# Patient Record
Sex: FEMALE | Race: WHITE | Employment: UNEMPLOYED | URBAN - METROPOLITAN AREA
[De-identification: names, ages, dates, MRNs, and addresses within clinical notes are randomized per-mention and may not be internally consistent; named-entity substitution may affect disease eponyms.]

---

## 2017-01-19 ENCOUNTER — TELEPHONE (OUTPATIENT)
Dept: FAMILY MEDICINE CLINIC | Age: 27
End: 2017-01-19

## 2017-02-24 DIAGNOSIS — F41.9 ANXIETY: ICD-10-CM

## 2017-04-18 ENCOUNTER — OFFICE VISIT (OUTPATIENT)
Dept: FAMILY MEDICINE CLINIC | Age: 27
End: 2017-04-18

## 2017-04-18 VITALS
SYSTOLIC BLOOD PRESSURE: 116 MMHG | RESPIRATION RATE: 18 BRPM | BODY MASS INDEX: 21.52 KG/M2 | TEMPERATURE: 97.9 F | HEART RATE: 98 BPM | WEIGHT: 142 LBS | DIASTOLIC BLOOD PRESSURE: 70 MMHG | HEIGHT: 68 IN

## 2017-04-18 DIAGNOSIS — F32.A DEPRESSION, UNSPECIFIED DEPRESSION TYPE: Primary | ICD-10-CM

## 2017-04-18 DIAGNOSIS — F41.9 ANXIETY: ICD-10-CM

## 2017-04-18 PROCEDURE — 99212 OFFICE O/P EST SF 10 MIN: CPT | Performed by: NURSE PRACTITIONER

## 2017-04-18 RX ORDER — ALPRAZOLAM 0.25 MG/1
0.25 TABLET ORAL NIGHTLY PRN
Qty: 10 TABLET | Refills: 0 | Status: SHIPPED | OUTPATIENT
Start: 2017-04-18 | End: 2018-04-05

## 2018-04-05 PROBLEM — I51.9: Status: ACTIVE | Noted: 2018-04-05

## 2018-04-05 PROBLEM — O99.419: Status: ACTIVE | Noted: 2018-04-05

## 2018-04-20 PROBLEM — Z13.79 GENETIC SCREENING: Status: ACTIVE | Noted: 2018-04-20

## 2018-04-20 PROBLEM — I51.9 HEART DISEASE IN MOTHER AFFECTING PREGNANCY IN FIRST TRIMESTER: Status: ACTIVE | Noted: 2018-04-20

## 2018-04-20 PROBLEM — O99.411 HEART DISEASE IN MOTHER AFFECTING PREGNANCY IN FIRST TRIMESTER: Status: ACTIVE | Noted: 2018-04-20

## 2018-04-20 PROBLEM — O99.419 HEART DISEASE IN MOTHER COMPLICATING PREGNANCY: Status: ACTIVE | Noted: 2018-04-20

## 2018-05-20 PROBLEM — Z13.79 GENETIC SCREENING: Status: RESOLVED | Noted: 2018-04-20 | Resolved: 2018-05-20

## 2018-06-28 PROBLEM — O44.40 LOW-LYING PLACENTA: Status: ACTIVE | Noted: 2018-06-28

## 2018-09-04 PROBLEM — Z34.90 PRENATAL CARE, ANTEPARTUM: Status: ACTIVE | Noted: 2018-09-04

## 2018-10-09 PROBLEM — O26.613 CHOLESTASIS OF PREGNANCY IN THIRD TRIMESTER: Status: ACTIVE | Noted: 2018-10-09

## 2018-10-09 PROBLEM — K83.1 CHOLESTASIS OF PREGNANCY IN THIRD TRIMESTER: Status: ACTIVE | Noted: 2018-10-09

## 2018-11-30 PROBLEM — Z34.90 PRENATAL CARE, ANTEPARTUM: Status: RESOLVED | Noted: 2018-09-04 | Resolved: 2018-11-30

## 2018-11-30 PROBLEM — O99.419: Status: RESOLVED | Noted: 2018-04-05 | Resolved: 2018-11-30

## 2018-11-30 PROBLEM — K83.1 CHOLESTASIS OF PREGNANCY IN THIRD TRIMESTER: Status: RESOLVED | Noted: 2018-10-09 | Resolved: 2018-11-30

## 2018-11-30 PROBLEM — O44.40 LOW-LYING PLACENTA: Status: RESOLVED | Noted: 2018-06-28 | Resolved: 2018-11-30

## 2018-11-30 PROBLEM — O26.613 CHOLESTASIS OF PREGNANCY IN THIRD TRIMESTER: Status: RESOLVED | Noted: 2018-10-09 | Resolved: 2018-11-30

## 2018-11-30 PROBLEM — I51.9: Status: RESOLVED | Noted: 2018-04-05 | Resolved: 2018-11-30

## 2020-06-01 PROBLEM — O99.419 CARDIAC CONDITION AFFECTING PREGNANCY, ANTEPARTUM: Status: ACTIVE | Noted: 2020-06-01

## 2020-06-01 PROBLEM — I51.9 CARDIAC CONDITION AFFECTING PREGNANCY, ANTEPARTUM: Status: ACTIVE | Noted: 2020-06-01

## 2020-06-01 PROBLEM — Z34.90 PRENATAL CARE: Status: ACTIVE | Noted: 2020-06-01

## 2020-11-01 PROBLEM — O09.891 SUPERVISION OF OTHER HIGH RISK PREGNANCIES, FIRST TRIMESTER: Status: ACTIVE | Noted: 2020-06-12

## 2020-12-12 PROBLEM — O60.00 PREMATURE LABOR: Status: ACTIVE | Noted: 2020-12-12

## 2020-12-13 PROBLEM — O09.891 SUPERVISION OF OTHER HIGH RISK PREGNANCIES, FIRST TRIMESTER: Status: RESOLVED | Noted: 2020-06-12 | Resolved: 2020-12-13

## 2020-12-13 PROBLEM — I51.9 CARDIAC CONDITION AFFECTING PREGNANCY, ANTEPARTUM: Status: RESOLVED | Noted: 2020-06-01 | Resolved: 2020-12-13

## 2020-12-13 PROBLEM — O99.419 CARDIAC CONDITION AFFECTING PREGNANCY, ANTEPARTUM: Status: RESOLVED | Noted: 2020-06-01 | Resolved: 2020-12-13

## 2020-12-13 PROBLEM — Z34.90 PRENATAL CARE: Status: RESOLVED | Noted: 2020-06-01 | Resolved: 2020-12-13

## 2023-02-01 PROBLEM — J01.90 ACUTE SINUSITIS: Status: ACTIVE | Noted: 2023-02-01

## 2023-02-01 PROBLEM — F41.1 GENERALIZED ANXIETY DISORDER: Status: ACTIVE | Noted: 2023-02-01

## 2023-02-01 PROBLEM — F32.A DEPRESSION: Status: ACTIVE | Noted: 2023-02-01

## 2023-02-01 PROBLEM — J30.9 ALLERGIC RHINITIS: Status: ACTIVE | Noted: 2023-02-01

## 2023-02-01 RX ORDER — HYDROXYZINE HYDROCHLORIDE 25 MG/1
25 TABLET, FILM COATED ORAL 3 TIMES DAILY PRN
COMMUNITY

## 2023-03-15 ENCOUNTER — APPOINTMENT (OUTPATIENT)
Dept: PRIMARY CARE | Facility: CLINIC | Age: 33
End: 2023-03-15
Payer: COMMERCIAL

## 2023-04-19 ENCOUNTER — OFFICE VISIT (OUTPATIENT)
Dept: PRIMARY CARE | Facility: CLINIC | Age: 33
End: 2023-04-19
Payer: MEDICAID

## 2023-04-19 VITALS
TEMPERATURE: 98.1 F | DIASTOLIC BLOOD PRESSURE: 83 MMHG | BODY MASS INDEX: 27.37 KG/M2 | HEART RATE: 80 BPM | HEIGHT: 69 IN | SYSTOLIC BLOOD PRESSURE: 122 MMHG | WEIGHT: 184.8 LBS | OXYGEN SATURATION: 97 %

## 2023-04-19 DIAGNOSIS — Z76.89 ENCOUNTER TO ESTABLISH CARE: Primary | ICD-10-CM

## 2023-04-19 DIAGNOSIS — Z95.810 CARDIAC DEFIBRILLATOR IN PLACE: ICD-10-CM

## 2023-04-19 DIAGNOSIS — F32.A DEPRESSION, UNSPECIFIED DEPRESSION TYPE: ICD-10-CM

## 2023-04-19 DIAGNOSIS — F41.1 GENERALIZED ANXIETY DISORDER: ICD-10-CM

## 2023-04-19 DIAGNOSIS — I47.29 OTHER VENTRICULAR TACHYCARDIA (MULTI): ICD-10-CM

## 2023-04-19 PROBLEM — J30.9 ALLERGIC RHINITIS: Status: RESOLVED | Noted: 2023-02-01 | Resolved: 2023-04-19

## 2023-04-19 PROBLEM — Z00.00 HEALTHCARE MAINTENANCE: Status: ACTIVE | Noted: 2023-04-19

## 2023-04-19 PROBLEM — J01.90 ACUTE SINUSITIS: Status: RESOLVED | Noted: 2023-02-01 | Resolved: 2023-04-19

## 2023-04-19 PROCEDURE — 99214 OFFICE O/P EST MOD 30 MIN: CPT | Performed by: NURSE PRACTITIONER

## 2023-04-19 PROCEDURE — 1036F TOBACCO NON-USER: CPT | Performed by: NURSE PRACTITIONER

## 2023-04-19 ASSESSMENT — PATIENT HEALTH QUESTIONNAIRE - PHQ9
8. MOVING OR SPEAKING SO SLOWLY THAT OTHER PEOPLE COULD HAVE NOTICED. OR THE OPPOSITE, BEING SO FIGETY OR RESTLESS THAT YOU HAVE BEEN MOVING AROUND A LOT MORE THAN USUAL: NOT AT ALL
SUM OF ALL RESPONSES TO PHQ QUESTIONS 1-9: 12
4. FEELING TIRED OR HAVING LITTLE ENERGY: MORE THAN HALF THE DAYS
10. IF YOU CHECKED OFF ANY PROBLEMS, HOW DIFFICULT HAVE THESE PROBLEMS MADE IT FOR YOU TO DO YOUR WORK, TAKE CARE OF THINGS AT HOME, OR GET ALONG WITH OTHER PEOPLE: SOMEWHAT DIFFICULT
1. LITTLE INTEREST OR PLEASURE IN DOING THINGS: NEARLY EVERY DAY
7. TROUBLE CONCENTRATING ON THINGS, SUCH AS READING THE NEWSPAPER OR WATCHING TELEVISION: NOT AT ALL
2. FEELING DOWN, DEPRESSED OR HOPELESS: NEARLY EVERY DAY
6. FEELING BAD ABOUT YOURSELF - OR THAT YOU ARE A FAILURE OR HAVE LET YOURSELF OR YOUR FAMILY DOWN: SEVERAL DAYS
3. TROUBLE FALLING OR STAYING ASLEEP OR SLEEPING TOO MUCH: NEARLY EVERY DAY
SUM OF ALL RESPONSES TO PHQ9 QUESTIONS 1 AND 2: 6
5. POOR APPETITE OR OVEREATING: NOT AT ALL
9. THOUGHTS THAT YOU WOULD BE BETTER OFF DEAD, OR OF HURTING YOURSELF: NOT AT ALL

## 2023-04-19 ASSESSMENT — ENCOUNTER SYMPTOMS
PALPITATIONS: 0
EYE ITCHING: 0
HEMATURIA: 0
NAUSEA: 0
CHILLS: 0
DYSURIA: 0
WOUND: 0
COUGH: 0
ARTHRALGIAS: 0
CONSTIPATION: 0
FEVER: 0
VOMITING: 0
FREQUENCY: 0
SHORTNESS OF BREATH: 0
EYE PAIN: 0
NERVOUS/ANXIOUS: 0
DIARRHEA: 0
ABDOMINAL DISTENTION: 0
NUMBNESS: 0
ACTIVITY CHANGE: 0
ABDOMINAL PAIN: 0
APPETITE CHANGE: 0
WHEEZING: 0

## 2023-04-19 NOTE — ASSESSMENT & PLAN NOTE
-chronic  -Denies suicidal ideation currently but does report history of this while on Celexa.   -Currently not taking any antidepressant and is interested in starting back on Trintellix  -She was taken off Trintellix due to wanting to become pregnant at that time.     Plan:  - trintellix ordered  - patient to follow up in 2 weeks

## 2023-04-19 NOTE — PATIENT INSTRUCTIONS
Obtain blood work as ordered  Start medication as prescribed   Call if you have any questions or concerns   Follow up in two weeks

## 2023-04-19 NOTE — ASSESSMENT & PLAN NOTE
-Dx in 2006 after syncopal episode running track   -Defibrillator placed in 2006 and replaced in 2016  -Follows with Cardiologist (Dr. Tera Valdez at [facility]): follows with them ever 1 year, defibrillator checks occur every 3 months.

## 2023-04-19 NOTE — PROGRESS NOTES
Subjective   Chief Complaint: New Patient Visit.    HPI   Ayesha Mata is a 32 y.o. female who presents for New Patient Visit.    Patient presents to [facility] care.  Patient reports that she recently moved and is looking for a new PCP.    She reports feeling overall well.  She does report increase in her anxiety and depression and is interested in restarting an antidepressant/anxiolytic.    Patient reports that she struggled with depression/anxiety for many years and has tried various SSRIs and SNRIs with unfavorable side effects.  While on Celexa she had thoughts of suicide and was switched to Trintellix.  Patient is interested in restarting the Trintellix.  She reports she was initially taken off of it because of her plans to become pregnant.  She now has a 4-year-old and a 2-year-old and does not have any plans to become pregnant.    Patient reports that her last menstrual period was 2 weeks ago.  She follows with [facility] in [facility] for pap and says she is not due for one until next year - patient will have these records sent to our office     She denies suicidal ideation.  She feels that her depression is mild but her anxiety is increased.    Patient follows with cardiologist Dr. Tera Valdez at [facility] for Catecholaminergic polymorphic ventricular tachycardia which was diagnosed in 2006 at which time she had a defibrillator placed.  This defibrillator was replaced in 2016.  Patient reports that she follows with Dr. Tera Valdez every year and has defibrillator readings done every 3 months.  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   HENT:  Negative for congestion.    Eyes:  Negative for pain and itching.   Respiratory:  Negative for cough, shortness of breath and wheezing.    Cardiovascular:  Negative for chest pain, palpitations and leg swelling.   Gastrointestinal:  Negative for abdominal distention, abdominal pain, constipation, diarrhea, nausea and vomiting.   Genitourinary:  Negative for dysuria, frequency, hematuria and urgency.   Musculoskeletal:  Negative for arthralgias and gait problem.   Skin:  Negative for rash and wound.   Neurological:  Negative for numbness.   Psychiatric/Behavioral:  The patient is not nervous/anxious.        Objective   /83   Pulse 80   Temp 36.7 °C (98.1 °F) (Temporal)   Ht 1.74 m (5' 8.5\")   Wt 83.8 kg (184 lb 12.8 oz)   LMP 04/06/2023   SpO2 97%   BMI 27.69 kg/m²   BSA Body surface area is 2.01 meters squared.      Physical Exam  Constitutional:       Appearance: Normal appearance.   HENT:      Head: Normocephalic.      Right Ear: Tympanic membrane and external ear normal.      Left Ear: Tympanic membrane and external ear normal.      Nose: Nose normal.      Mouth/Throat:      Mouth: Mucous membranes are moist.      Pharynx: Oropharynx is clear.   Eyes:      Extraocular Movements: Extraocular movements intact.      Conjunctiva/sclera: Conjunctivae normal.      Pupils: Pupils are equal, round, and reactive to light.   Cardiovascular:      Rate and Rhythm: Normal rate and regular rhythm.      Pulses: Normal pulses.      Heart sounds: Normal heart sounds.   Pulmonary:      Effort: Pulmonary effort is normal.      Breath sounds: Normal breath sounds.   Abdominal:      General: Abdomen is flat. Bowel sounds are normal.      Palpations: Abdomen is soft.   Musculoskeletal:         General: Normal range of " motion.      Cervical back: Normal range of motion.      Right lower leg: No edema.      Left lower leg: No edema.   Skin:     General: Skin is warm and dry.   Neurological:      General: No focal deficit present.      Mental Status: She is alert and oriented to person, place, and time.   Psychiatric:         Mood and Affect: Mood is not anxious or depressed.       Legacy Encounter on 09/28/2022   Component Date Value Ref Range Status    Tobacco Screen, Urine 09/28/2022 NEGATIVE   Final    Comment: Cotinine, a metabolite of nicotine, is measured to screen   for nicotine exposure. The cut-off is set at 300ng/mL to   detect active exposure (smoking).    This test was developed and its performance characteristics   were determined by the [facility].     Legacy Encounter on 07/21/2022   Component Date Value Ref Range Status    WBC 07/21/2022 7.8  4.4 - 11.3 x10E9/L Final    RBC 07/21/2022 4.92  4.00 - 5.20 x10E12/L Final    Hemoglobin 07/21/2022 13.5  12.0 - 16.0 g/dL Final    Hematocrit 07/21/2022 43.2  36.0 - 46.0 % Final    MCV 07/21/2022 88  80 - 100 fL Final    MCHC 07/21/2022 31.3 (L)  32.0 - 36.0 g/dL Final    Platelets 07/21/2022 287  150 - 450 x10E9/L Final    RDW 07/21/2022 13.2  11.5 - 14.5 % Final    Lead 07/21/2022 <0.5  0.0 - 4.9 ug/dL Final    Comment:  The performance characteristics of this test have    been determined by [facility].   This test has not been approved by the FDA; however, the FDA    has determined that such clearance is not necessary.   This test is used for clinical purposes and should not be    regarded as investigational or for research.   This laboratory is certified under CLIA to perform high   complexity clinical laboratory testing.  INTERPRETATION:  0-5 ug/dL     WITHIN NORMAL RANGE FOR AN ADULT.    6-9 ug/dL     ALTHOUGH THIS LEVEL IS SLIGHTLY ABOVE                 NORMAL, THERE ARE NO DEFINITE  HEALTH                  EFFECTS  OR SYMPTOMS AT THIS LEVEL.                 PERFORM FOLLOW-UP TESTING 2-3 MONTHS.    10-39 ug/dL   PERFORM FOLLOW-UP TESTING IN 1-3 MONTHS                 CONDUCT OCCUPATIONAL, ENVIRONMENTAL AND                 MEDICAL HISTORY.POSSIBLE HEALTH EFFECTS                AT THIS LEVEL INCLUDE ELEVATED BLOOD                 PRESSURE,EFFECTS ON KIDNEY AND BRAI                           N,                 SPONTANEOUS ABORTIONS AND OTHER                 REPRODUCTIVE EFFECTS. NO ACTION IS                 REQUIRED UNDER THE OSHA STANDARD FOR                THIS LEAD LEVEL.  HOWEVER, UNDER OSHA,                 A PHYSICIAN MAY RECOMMEND A MEDICAL                 EXAMINATIONOF A LEAD-EXPOSED WORKER                 TO ASSESS FOR LEAD EFFECTS.    40-59 ug/dL   CONDUCT HISTORY, PHYSICAL EXAM AND                 CLINICAL ASSESSMENT FOR LEAD POISONING.                  REMOVE FROM LEAD EXPOSURE (OSHA                  REQUIRES REMOVAL IF AVERAGE OF 3                  CONSECUTIVE BLOOD LEAD LEVELSARE                 50 ug/dL OR MORE).                  PERFORM FOLLOW-UPTESTING IN 2-4 WEEKS.    60-PLUS ug/dL IMMEDIATE REMOVAL FROM LEAD EXPOSURE.                 REFER TO OCCUPATIONAL MEDICINE                 SPECIALIST  FOR EVALUATION AND                   CONSIDERATION OFCHELATION TREATMENT.    REFERENCES:   1.OSHA LEAD STANDARD 1910.1025                2.ASSOCIATION OF OCCUPATIONA                           L AND                   ENVIRONMENTAL CLINICS, MEDICAL                   MANAGEMENT GUIDELINES FOR LEAD-                   EXPOSED ADULTS, ([email or web address] UNDER                   PRINCIPLES AND GUIDANCE.      Glucose 07/21/2022 84  74 - 99 mg/dL Final    Sodium 07/21/2022 139  136 - 145 mmol/L Final    Potassium 07/21/2022 3.8  3.5 - 5.3 mmol/L Final    Chloride 07/21/2022 104  98 - 107 mmol/L Final    Bicarbonate 07/21/2022 27  21 - 32 mmol/L Final    Anion Gap 07/21/2022 12  10 - 20 mmol/L Final    Urea  Nitrogen 07/21/2022 8  6 - 23 mg/dL Final    Creatinine 07/21/2022 0.72  0.50 - 1.05 mg/dL Final    GFR Female 07/21/2022 >90  >90 mL/min/1.73m2 Final    Comment:  CALCULATIONS OF ESTIMATED GFR ARE PERFORMED   USING THE 2021 CKD-EPI STUDY REFIT EQUATION   WITHOUT THE RACE VARIABLE FOR THE IDMS-TRACEABLE   CREATININE METHODS.    https://jasn.asnjournals.org/content/early/2021/09/22/ASN.6684146700      Calcium 07/21/2022 9.4  8.6 - 10.3 mg/dL Final    Albumin 07/21/2022 4.8  3.4 - 5.0 g/dL Final    Alkaline Phosphatase 07/21/2022 47  33 - 110 U/L Final    Total Protein 07/21/2022 7.4  6.4 - 8.2 g/dL Final    AST 07/21/2022 14  9 - 39 U/L Final    Total Bilirubin 07/21/2022 0.5  0.0 - 1.2 mg/dL Final    ALT (SGPT) 07/21/2022 10  7 - 45 U/L Final    Comment:  Patients treated with Sulfasalazine may generate    falsely decreased results for ALT.       Current Outpatient Medications on File Prior to Visit   Medication Sig Dispense Refill    hydrOXYzine HCL (Atarax) 25 mg tablet Take 1 tablet (25 mg) by mouth 3 times a day as needed.       No current facility-administered medications on file prior to visit.     No images are attached to the encounter.            Assessment/Plan   Problem List Items Addressed This Visit          Circulatory    Other ventricular tachycardia (CMS/HCC)     -Dx in 2006 after syncopal episode running track   -Defibrillator placed in 2006 and replaced in 2016  -Follows with Cardiologist (Dr. Tera Valdez at [facility]): follows with them ever 1 year, defibrillator checks occur every 3 months.          Cardiac defibrillator in place       Other    Depression     -chronic  -Denies suicidal ideation currently but does report history of this while on Celexa.   -Currently not taking any antidepressant and is interested in starting back on Trintellix  -She was taken off Trintellix due to wanting to become pregnant at that time.     Plan:  - trintellix ordered  - patient to follow up in 2 weeks           Relevant Medications    vortioxetine (Trintellix) 10 mg tablet tablet    Other Relevant Orders    Comprehensive Metabolic Panel    Lipid Panel    CBC and Auto Differential    TSH with reflex to Free T4 if abnormal    Generalized anxiety disorder     -Trintellix ordered   -Continue hydroxyzine PRN          Relevant Medications    vortioxetine (Trintellix) 10 mg tablet tablet    Other Relevant Orders    Comprehensive Metabolic Panel    Lipid Panel    CBC and Auto Differential    TSH with reflex to Free T4 if abnormal     Other Visit Diagnoses       Encounter to establish care    -  Primary    Relevant Orders    Comprehensive Metabolic Panel    Lipid Panel    CBC and Auto Differential    TSH with reflex to Free T4 if abnormal

## 2023-04-19 NOTE — ASSESSMENT & PLAN NOTE
-Follows with GYN at [facility]   -PAP smear up to date per patient   -Patient to have records sent to our office

## 2023-05-03 ENCOUNTER — LAB (OUTPATIENT)
Dept: LAB | Facility: LAB | Age: 33
End: 2023-05-03
Payer: MEDICAID

## 2023-05-03 ENCOUNTER — OFFICE VISIT (OUTPATIENT)
Dept: PRIMARY CARE | Facility: CLINIC | Age: 33
End: 2023-05-03
Payer: MEDICAID

## 2023-05-03 VITALS
BODY MASS INDEX: 27.33 KG/M2 | HEART RATE: 82 BPM | SYSTOLIC BLOOD PRESSURE: 135 MMHG | TEMPERATURE: 97.8 F | WEIGHT: 182.4 LBS | DIASTOLIC BLOOD PRESSURE: 82 MMHG | OXYGEN SATURATION: 98 %

## 2023-05-03 DIAGNOSIS — F41.1 GENERALIZED ANXIETY DISORDER: ICD-10-CM

## 2023-05-03 DIAGNOSIS — Z76.89 ENCOUNTER TO ESTABLISH CARE: ICD-10-CM

## 2023-05-03 DIAGNOSIS — F32.A DEPRESSION, UNSPECIFIED DEPRESSION TYPE: Primary | ICD-10-CM

## 2023-05-03 DIAGNOSIS — F32.A DEPRESSION, UNSPECIFIED DEPRESSION TYPE: ICD-10-CM

## 2023-05-03 PROBLEM — H52.223 REGULAR ASTIGMATISM OF BOTH EYES: Status: ACTIVE | Noted: 2021-04-29

## 2023-05-03 PROBLEM — D50.9 IRON DEFICIENCY ANEMIA: Status: ACTIVE | Noted: 2023-03-20

## 2023-05-03 PROBLEM — Z77.011 EXPOSURE TO LEAD: Status: ACTIVE | Noted: 2023-03-20

## 2023-05-03 PROBLEM — Z77.011 EXPOSURE TO LEAD: Status: RESOLVED | Noted: 2023-03-20 | Resolved: 2023-05-03

## 2023-05-03 LAB
ALANINE AMINOTRANSFERASE (SGPT) (U/L) IN SER/PLAS: 11 U/L (ref 7–45)
ALBUMIN (G/DL) IN SER/PLAS: 4.7 G/DL (ref 3.4–5)
ALKALINE PHOSPHATASE (U/L) IN SER/PLAS: 45 U/L (ref 33–110)
ANION GAP IN SER/PLAS: 13 MMOL/L (ref 10–20)
ASPARTATE AMINOTRANSFERASE (SGOT) (U/L) IN SER/PLAS: 12 U/L (ref 9–39)
BASOPHILS (10*3/UL) IN BLOOD BY AUTOMATED COUNT: 0.05 X10E9/L (ref 0–0.1)
BASOPHILS/100 LEUKOCYTES IN BLOOD BY AUTOMATED COUNT: 0.8 % (ref 0–2)
BILIRUBIN TOTAL (MG/DL) IN SER/PLAS: 0.5 MG/DL (ref 0–1.2)
CALCIUM (MG/DL) IN SER/PLAS: 9.8 MG/DL (ref 8.6–10.6)
CARBON DIOXIDE, TOTAL (MMOL/L) IN SER/PLAS: 25 MMOL/L (ref 21–32)
CHLORIDE (MMOL/L) IN SER/PLAS: 105 MMOL/L (ref 98–107)
CHOLESTEROL (MG/DL) IN SER/PLAS: 195 MG/DL (ref 0–199)
CHOLESTEROL IN HDL (MG/DL) IN SER/PLAS: 39.8 MG/DL
CHOLESTEROL/HDL RATIO: 4.9
CREATININE (MG/DL) IN SER/PLAS: 0.86 MG/DL (ref 0.5–1.05)
EOSINOPHILS (10*3/UL) IN BLOOD BY AUTOMATED COUNT: 0.29 X10E9/L (ref 0–0.7)
EOSINOPHILS/100 LEUKOCYTES IN BLOOD BY AUTOMATED COUNT: 4.8 % (ref 0–6)
ERYTHROCYTE DISTRIBUTION WIDTH (RATIO) BY AUTOMATED COUNT: 13.6 % (ref 11.5–14.5)
ERYTHROCYTE MEAN CORPUSCULAR HEMOGLOBIN CONCENTRATION (G/DL) BY AUTOMATED: 30.6 G/DL (ref 32–36)
ERYTHROCYTE MEAN CORPUSCULAR VOLUME (FL) BY AUTOMATED COUNT: 86 FL (ref 80–100)
ERYTHROCYTES (10*6/UL) IN BLOOD BY AUTOMATED COUNT: 4.88 X10E12/L (ref 4–5.2)
GFR FEMALE: >90 ML/MIN/1.73M2
GLUCOSE (MG/DL) IN SER/PLAS: 91 MG/DL (ref 74–99)
HEMATOCRIT (%) IN BLOOD BY AUTOMATED COUNT: 41.8 % (ref 36–46)
HEMOGLOBIN (G/DL) IN BLOOD: 12.8 G/DL (ref 12–16)
IMMATURE GRANULOCYTES/100 LEUKOCYTES IN BLOOD BY AUTOMATED COUNT: 0.2 % (ref 0–0.9)
LDL: 139 MG/DL (ref 0–99)
LEUKOCYTES (10*3/UL) IN BLOOD BY AUTOMATED COUNT: 6 X10E9/L (ref 4.4–11.3)
LYMPHOCYTES (10*3/UL) IN BLOOD BY AUTOMATED COUNT: 1.94 X10E9/L (ref 1.2–4.8)
LYMPHOCYTES/100 LEUKOCYTES IN BLOOD BY AUTOMATED COUNT: 32.3 % (ref 13–44)
MONOCYTES (10*3/UL) IN BLOOD BY AUTOMATED COUNT: 0.36 X10E9/L (ref 0.1–1)
MONOCYTES/100 LEUKOCYTES IN BLOOD BY AUTOMATED COUNT: 6 % (ref 2–10)
NEUTROPHILS (10*3/UL) IN BLOOD BY AUTOMATED COUNT: 3.35 X10E9/L (ref 1.2–7.7)
NEUTROPHILS/100 LEUKOCYTES IN BLOOD BY AUTOMATED COUNT: 55.9 % (ref 40–80)
NRBC (PER 100 WBCS) BY AUTOMATED COUNT: 0 /100 WBC (ref 0–0)
PLATELETS (10*3/UL) IN BLOOD AUTOMATED COUNT: 281 X10E9/L (ref 150–450)
POTASSIUM (MMOL/L) IN SER/PLAS: 4.1 MMOL/L (ref 3.5–5.3)
PROTEIN TOTAL: 7.3 G/DL (ref 6.4–8.2)
SODIUM (MMOL/L) IN SER/PLAS: 139 MMOL/L (ref 136–145)
THYROTROPIN (MIU/L) IN SER/PLAS BY DETECTION LIMIT <= 0.05 MIU/L: 1.09 MIU/L (ref 0.44–3.98)
TRIGLYCERIDE (MG/DL) IN SER/PLAS: 81 MG/DL (ref 0–149)
UREA NITROGEN (MG/DL) IN SER/PLAS: 9 MG/DL (ref 6–23)
VLDL: 16 MG/DL (ref 0–40)

## 2023-05-03 PROCEDURE — 36415 COLL VENOUS BLD VENIPUNCTURE: CPT

## 2023-05-03 PROCEDURE — 80053 COMPREHEN METABOLIC PANEL: CPT

## 2023-05-03 PROCEDURE — 99213 OFFICE O/P EST LOW 20 MIN: CPT | Performed by: NURSE PRACTITIONER

## 2023-05-03 PROCEDURE — 1036F TOBACCO NON-USER: CPT | Performed by: NURSE PRACTITIONER

## 2023-05-03 PROCEDURE — 85025 COMPLETE CBC W/AUTO DIFF WBC: CPT

## 2023-05-03 PROCEDURE — 80061 LIPID PANEL: CPT

## 2023-05-03 PROCEDURE — 84443 ASSAY THYROID STIM HORMONE: CPT

## 2023-05-03 ASSESSMENT — ENCOUNTER SYMPTOMS
NERVOUS/ANXIOUS: 0
CHILLS: 0
SLEEP DISTURBANCE: 0
HALLUCINATIONS: 0
VOMITING: 0
ABDOMINAL PAIN: 0
DIARRHEA: 0
FEVER: 0
NAUSEA: 0

## 2023-05-03 ASSESSMENT — PATIENT HEALTH QUESTIONNAIRE - PHQ9
2. FEELING DOWN, DEPRESSED OR HOPELESS: SEVERAL DAYS
1. LITTLE INTEREST OR PLEASURE IN DOING THINGS: SEVERAL DAYS
SUM OF ALL RESPONSES TO PHQ9 QUESTIONS 1 AND 2: 2
10. IF YOU CHECKED OFF ANY PROBLEMS, HOW DIFFICULT HAVE THESE PROBLEMS MADE IT FOR YOU TO DO YOUR WORK, TAKE CARE OF THINGS AT HOME, OR GET ALONG WITH OTHER PEOPLE: SOMEWHAT DIFFICULT

## 2023-05-03 ASSESSMENT — ANXIETY QUESTIONNAIRES
5. BEING SO RESTLESS THAT IT IS HARD TO SIT STILL: NOT AT ALL
2. NOT BEING ABLE TO STOP OR CONTROL WORRYING: SEVERAL DAYS
4. TROUBLE RELAXING: SEVERAL DAYS
1. FEELING NERVOUS, ANXIOUS, OR ON EDGE: SEVERAL DAYS
3. WORRYING TOO MUCH ABOUT DIFFERENT THINGS: SEVERAL DAYS
7. FEELING AFRAID AS IF SOMETHING AWFUL MIGHT HAPPEN: NOT AT ALL
GAD7 TOTAL SCORE: 5
6. BECOMING EASILY ANNOYED OR IRRITABLE: SEVERAL DAYS
IF YOU CHECKED OFF ANY PROBLEMS ON THIS QUESTIONNAIRE, HOW DIFFICULT HAVE THESE PROBLEMS MADE IT FOR YOU TO DO YOUR WORK, TAKE CARE OF THINGS AT HOME, OR GET ALONG WITH OTHER PEOPLE: SOMEWHAT DIFFICULT

## 2023-05-03 NOTE — PROGRESS NOTES
Subjective   Chief Complaint: Depression (Follow up).    HPI   Ayesha Mata is a 32 y.o. female who presents for Depression (Follow up).    Patient presents for follow up regarding depression. She was started on Trintellix during last visit. She report significant improvement in her mood since starting Trintellix. She reports some mild nausea after first taking it but reports this is manageable and quickly resolves.     She has started exercising again and reports ability to keep up with tasks. She denies any thoughts of suicide or suicidal ideation.     Patient denies any new concerns today.     Review of Systems   Constitutional:  Negative for chills and fever.   Cardiovascular:  Negative for chest pain.   Gastrointestinal:  Negative for abdominal pain, diarrhea, nausea and vomiting.   Psychiatric/Behavioral:  Negative for hallucinations, self-injury, sleep disturbance and suicidal ideas. The patient is not nervous/anxious.        Objective   /82   Pulse 82   Temp 36.6 °C (97.8 °F) (Temporal)   Wt 82.7 kg (182 lb 6.4 oz)   LMP 04/06/2023   SpO2 98%   BMI 27.33 kg/m²   BSA Body surface area is 2 meters squared.      Physical Exam  Constitutional:       Appearance: Normal appearance.   Cardiovascular:      Rate and Rhythm: Normal rate and regular rhythm.   Pulmonary:      Effort: Pulmonary effort is normal.      Breath sounds: Normal breath sounds.   Neurological:      General: No focal deficit present.      Mental Status: She is alert.       Legacy Encounter on 09/28/2022   Component Date Value Ref Range Status    Tobacco Screen, Urine 09/28/2022 NEGATIVE   Final    Comment: Cotinine, a metabolite of nicotine, is measured to screen   for nicotine exposure. The cut-off is set at 300ng/mL to   detect active exposure (smoking).    This test was developed and its performance characteristics   were determined by the [facility].     Legacy Encounter on  07/21/2022   Component Date Value Ref Range Status    WBC 07/21/2022 7.8  4.4 - 11.3 x10E9/L Final    RBC 07/21/2022 4.92  4.00 - 5.20 x10E12/L Final    Hemoglobin 07/21/2022 13.5  12.0 - 16.0 g/dL Final    Hematocrit 07/21/2022 43.2  36.0 - 46.0 % Final    MCV 07/21/2022 88  80 - 100 fL Final    MCHC 07/21/2022 31.3 (L)  32.0 - 36.0 g/dL Final    Platelets 07/21/2022 287  150 - 450 x10E9/L Final    RDW 07/21/2022 13.2  11.5 - 14.5 % Final    Lead 07/21/2022 <0.5  0.0 - 4.9 ug/dL Final    Comment:  The performance characteristics of this test have    been determined by [facility].   This test has not been approved by the FDA; however, the FDA    has determined that such clearance is not necessary.   This test is used for clinical purposes and should not be    regarded as investigational or for research.   This laboratory is certified under CLIA to perform high   complexity clinical laboratory testing.  INTERPRETATION:  0-5 ug/dL     WITHIN NORMAL RANGE FOR AN ADULT.    6-9 ug/dL     ALTHOUGH THIS LEVEL IS SLIGHTLY ABOVE                 NORMAL, THERE ARE NO DEFINITE HEALTH                  EFFECTS  OR SYMPTOMS AT THIS LEVEL.                 PERFORM FOLLOW-UP TESTING 2-3 MONTHS.    10-39 ug/dL   PERFORM FOLLOW-UP TESTING IN 1-3 MONTHS                 CONDUCT OCCUPATIONAL, ENVIRONMENTAL AND                 MEDICAL HISTORY.POSSIBLE HEALTH EFFECTS                AT THIS LEVEL INCLUDE ELEVATED BLOOD                 PRESSURE,EFFECTS ON KIDNEY AND BRAI                           N,                 SPONTANEOUS ABORTIONS AND OTHER                 REPRODUCTIVE EFFECTS. NO ACTION IS                 REQUIRED UNDER THE OSHA STANDARD FOR                THIS LEAD LEVEL.  HOWEVER, UNDER OSHA,                 A PHYSICIAN MAY RECOMMEND A MEDICAL                 EXAMINATIONOF A LEAD-EXPOSED WORKER                 TO ASSESS FOR LEAD EFFECTS.    40-59 ug/dL   CONDUCT HISTORY, PHYSICAL EXAM AND                  CLINICAL ASSESSMENT FOR LEAD POISONING.                  REMOVE FROM LEAD EXPOSURE (OSHA                  REQUIRES REMOVAL IF AVERAGE OF 3                  CONSECUTIVE BLOOD LEAD LEVELSARE                 50 ug/dL OR MORE).                  PERFORM FOLLOW-UPTESTING IN 2-4 WEEKS.    60-PLUS ug/dL IMMEDIATE REMOVAL FROM LEAD EXPOSURE.                 REFER TO OCCUPATIONAL MEDICINE                 SPECIALIST  FOR EVALUATION AND                   CONSIDERATION OFCHELATION TREATMENT.    REFERENCES:   1.[place] LEAD STANDARD 1910.1025                2.ASSOCIATION OF OCCUPATIONA                           L AND                   ENVIRONMENTAL CLINICS, MEDICAL                   MANAGEMENT GUIDELINES FOR LEAD-                   EXPOSED ADULTS, ([email or web address].[email or web address] UNDER                   PRINCIPLES AND GUIDANCE.      Glucose 07/21/2022 84  74 - 99 mg/dL Final    Sodium 07/21/2022 139  136 - 145 mmol/L Final    Potassium 07/21/2022 3.8  3.5 - 5.3 mmol/L Final    Chloride 07/21/2022 104  98 - 107 mmol/L Final    Bicarbonate 07/21/2022 27  21 - 32 mmol/L Final    Anion Gap 07/21/2022 12  10 - 20 mmol/L Final    Urea Nitrogen 07/21/2022 8  6 - 23 mg/dL Final    Creatinine 07/21/2022 0.72  0.50 - 1.05 mg/dL Final    GFR Female 07/21/2022 >90  >90 mL/min/1.73m2 Final    Comment:  CALCULATIONS OF ESTIMATED GFR ARE PERFORMED   USING THE 2021 CKD-EPI STUDY REFIT EQUATION   WITHOUT THE RACE VARIABLE FOR THE IDMS-TRACEABLE   CREATININE METHODS.    https://jasn.asnjournals.org/content/early/2021/09/22/ASN.1078371901      Calcium 07/21/2022 9.4  8.6 - 10.3 mg/dL Final    Albumin 07/21/2022 4.8  3.4 - 5.0 g/dL Final    Alkaline Phosphatase 07/21/2022 47  33 - 110 U/L Final    Total Protein 07/21/2022 7.4  6.4 - 8.2 g/dL Final    AST 07/21/2022 14  9 - 39 U/L Final    Total Bilirubin 07/21/2022 0.5  0.0 - 1.2 mg/dL Final    ALT (SGPT) 07/21/2022 10  7 - 45 U/L Final    Comment:  Patients treated with Sulfasalazine may generate    falsely  decreased results for ALT.       Current Outpatient Medications on File Prior to Visit   Medication Sig Dispense Refill    hydrOXYzine HCL (Atarax) 25 mg tablet Take 1 tablet (25 mg) by mouth 3 times a day as needed.      vortioxetine (Trintellix) 10 mg tablet tablet Take 1 tablet (10 mg) by mouth once daily. 30 tablet 0     No current facility-administered medications on file prior to visit.     No images are attached to the encounter.            Assessment/Plan   Problem List Items Addressed This Visit          Other    Depression - Primary     - Continue Trintellix   - patient reports feeling well on current dosing   - follow up in 1 month   - Advised patient to report to ER if she develops thoughts of suicide          Relevant Medications    vortioxetine (Trintellix) 10 mg tablet tablet    Generalized anxiety disorder     Trintellix continued           Relevant Medications    vortioxetine (Trintellix) 10 mg tablet tablet

## 2023-05-03 NOTE — ASSESSMENT & PLAN NOTE
- Continue Trintellix   - patient reports feeling well on current dosing   - follow up in 1 month   - Advised patient to report to ER if she develops thoughts of suicide

## 2023-06-07 ENCOUNTER — OFFICE VISIT (OUTPATIENT)
Dept: PRIMARY CARE | Facility: CLINIC | Age: 33
End: 2023-06-07
Payer: COMMERCIAL

## 2023-06-07 VITALS
HEART RATE: 75 BPM | SYSTOLIC BLOOD PRESSURE: 110 MMHG | DIASTOLIC BLOOD PRESSURE: 75 MMHG | BODY MASS INDEX: 26.97 KG/M2 | WEIGHT: 180 LBS | OXYGEN SATURATION: 97 %

## 2023-06-07 DIAGNOSIS — F32.A DEPRESSION, UNSPECIFIED DEPRESSION TYPE: ICD-10-CM

## 2023-06-07 DIAGNOSIS — F41.1 GENERALIZED ANXIETY DISORDER: Primary | ICD-10-CM

## 2023-06-07 PROCEDURE — 99213 OFFICE O/P EST LOW 20 MIN: CPT | Performed by: NURSE PRACTITIONER

## 2023-06-07 PROCEDURE — 1036F TOBACCO NON-USER: CPT | Performed by: NURSE PRACTITIONER

## 2023-06-07 ASSESSMENT — ANXIETY QUESTIONNAIRES
4. TROUBLE RELAXING: NEARLY EVERY DAY
2. NOT BEING ABLE TO STOP OR CONTROL WORRYING: NEARLY EVERY DAY
1. FEELING NERVOUS, ANXIOUS, OR ON EDGE: NEARLY EVERY DAY
3. WORRYING TOO MUCH ABOUT DIFFERENT THINGS: NEARLY EVERY DAY
IF YOU CHECKED OFF ANY PROBLEMS ON THIS QUESTIONNAIRE, HOW DIFFICULT HAVE THESE PROBLEMS MADE IT FOR YOU TO DO YOUR WORK, TAKE CARE OF THINGS AT HOME, OR GET ALONG WITH OTHER PEOPLE: SOMEWHAT DIFFICULT
7. FEELING AFRAID AS IF SOMETHING AWFUL MIGHT HAPPEN: MORE THAN HALF THE DAYS
6. BECOMING EASILY ANNOYED OR IRRITABLE: SEVERAL DAYS
GAD7 TOTAL SCORE: 15
5. BEING SO RESTLESS THAT IT IS HARD TO SIT STILL: NOT AT ALL

## 2023-06-07 ASSESSMENT — ENCOUNTER SYMPTOMS
VOMITING: 0
SLEEP DISTURBANCE: 1
ABDOMINAL PAIN: 0
DIARRHEA: 0
PALPITATIONS: 0
FEVER: 0
SHORTNESS OF BREATH: 0
NAUSEA: 0
CONFUSION: 0
DYSPHORIC MOOD: 0
HALLUCINATIONS: 0
NERVOUS/ANXIOUS: 1
HYPERACTIVE: 0
CHILLS: 0
COUGH: 0

## 2023-06-07 ASSESSMENT — PATIENT HEALTH QUESTIONNAIRE - PHQ9
2. FEELING DOWN, DEPRESSED OR HOPELESS: MORE THAN HALF THE DAYS
3. TROUBLE FALLING OR STAYING ASLEEP OR SLEEPING TOO MUCH: NEARLY EVERY DAY
4. FEELING TIRED OR HAVING LITTLE ENERGY: MORE THAN HALF THE DAYS
SUM OF ALL RESPONSES TO PHQ QUESTIONS 1-9: 11
SUM OF ALL RESPONSES TO PHQ9 QUESTIONS 1 AND 2: 4
1. LITTLE INTEREST OR PLEASURE IN DOING THINGS: MORE THAN HALF THE DAYS
5. POOR APPETITE OR OVEREATING: NOT AT ALL
10. IF YOU CHECKED OFF ANY PROBLEMS, HOW DIFFICULT HAVE THESE PROBLEMS MADE IT FOR YOU TO DO YOUR WORK, TAKE CARE OF THINGS AT HOME, OR GET ALONG WITH OTHER PEOPLE: SOMEWHAT DIFFICULT
9. THOUGHTS THAT YOU WOULD BE BETTER OFF DEAD, OR OF HURTING YOURSELF: NOT AT ALL
7. TROUBLE CONCENTRATING ON THINGS, SUCH AS READING THE NEWSPAPER OR WATCHING TELEVISION: NOT AT ALL
6. FEELING BAD ABOUT YOURSELF - OR THAT YOU ARE A FAILURE OR HAVE LET YOURSELF OR YOUR FAMILY DOWN: MORE THAN HALF THE DAYS
8. MOVING OR SPEAKING SO SLOWLY THAT OTHER PEOPLE COULD HAVE NOTICED. OR THE OPPOSITE, BEING SO FIGETY OR RESTLESS THAT YOU HAVE BEEN MOVING AROUND A LOT MORE THAN USUAL: NOT AT ALL

## 2023-06-07 NOTE — ASSESSMENT & PLAN NOTE
- Trintelix decrease to every other day for 1 week and then stop  - Referral to Psychiatrist for further evaluation and treatment  - Advised patient to report to ER if her symptoms worsen   - follow up in 2 weeks or sooner if needed

## 2023-06-07 NOTE — PROGRESS NOTES
Subjective   Chief Complaint: Depression and Anxiety.    HPI   Ayesha Mata is a 32 y.o. female who presents for Depression and Anxiety.  Patient presents with 2 week history of increasing anxiety. She was started on Trintelix on 4/19/2023 and initially reported improved symptoms but now she feels that her anxiety is worse. She has been compliant with current medication regimen  She does not gotten any anxiety relief from the hydroxyzine and says it only made her tired.   She denies any thoughts of suicide and feels that her depression is well controlled but  the anxiety is the issue.   She has been feeling less motivated because the anxiety is so high.     She has had trintelix in the past and did not have these symptoms before.   She has been on SSRIs in the past with adverse reactions and even had suicidal thoughts while on SSRIs.     We will taper patient off the Trintleix as it may be increasing her anxiety and have her referred to see a psychiatrist. She is agreeable with this plan.     Patient denies fever, chills, nausea, vomiting, diarrhea, chest pain, heart palpations, or shortness of breath.     Review of Systems   Constitutional:  Negative for chills and fever.   Respiratory:  Negative for cough and shortness of breath.    Cardiovascular:  Negative for chest pain and palpitations.   Gastrointestinal:  Negative for abdominal pain, diarrhea, nausea and vomiting.   Psychiatric/Behavioral:  Positive for sleep disturbance. Negative for confusion, dysphoric mood, hallucinations, self-injury and suicidal ideas. The patient is nervous/anxious. The patient is not hyperactive.        Objective   /75   Pulse 75   Wt 81.6 kg (180 lb)   SpO2 97%   BMI 26.97 kg/m²   BSA Body surface area is 1.99 meters squared.      Physical Exam  Constitutional:       Appearance: Normal appearance.   Cardiovascular:      Rate and Rhythm: Normal rate and regular rhythm.   Pulmonary:      Effort: Pulmonary effort is normal.       Breath sounds: Normal breath sounds.   Abdominal:      General: Abdomen is flat.      Palpations: Abdomen is soft.   Neurological:      General: No focal deficit present.      Mental Status: She is alert and oriented to person, place, and time.      Cranial Nerves: No cranial nerve deficit.   Psychiatric:         Mood and Affect: Mood normal.       Lab on 05/03/2023   Component Date Value Ref Range Status    Glucose 05/03/2023 91  74 - 99 mg/dL Final    Sodium 05/03/2023 139  136 - 145 mmol/L Final    Potassium 05/03/2023 4.1  3.5 - 5.3 mmol/L Final    Chloride 05/03/2023 105  98 - 107 mmol/L Final    Bicarbonate 05/03/2023 25  21 - 32 mmol/L Final    Anion Gap 05/03/2023 13  10 - 20 mmol/L Final    Urea Nitrogen 05/03/2023 9  6 - 23 mg/dL Final    Creatinine 05/03/2023 0.86  0.50 - 1.05 mg/dL Final    GFR Female 05/03/2023 >90  >90 mL/min/1.73m2 Final     CALCULATIONS OF ESTIMATED GFR ARE PERFORMED   USING THE 2021 CKD-EPI STUDY REFIT EQUATION   WITHOUT THE RACE VARIABLE FOR THE IDMS-TRACEABLE   CREATININE METHODS.    https://jasn.asnjournals.org/content/early/2021/09/22/ASN.0580537019    Calcium 05/03/2023 9.8  8.6 - 10.6 mg/dL Final    Albumin 05/03/2023 4.7  3.4 - 5.0 g/dL Final    Alkaline Phosphatase 05/03/2023 45  33 - 110 U/L Final    Total Protein 05/03/2023 7.3  6.4 - 8.2 g/dL Final    AST 05/03/2023 12  9 - 39 U/L Final    Total Bilirubin 05/03/2023 0.5  0.0 - 1.2 mg/dL Final    ALT (SGPT) 05/03/2023 11  7 - 45 U/L Final     Patients treated with Sulfasalazine may generate    falsely decreased results for ALT.    Cholesterol 05/03/2023 195  0 - 199 mg/dL Final    .      AGE      DESIRABLE   BORDERLINE HIGH   HIGH     0-19 Y     0 - 169       170 - 199     >/= 200    20-24 Y     0 - 189       190 - 224     >/= 225         >24 Y     0 - 199       200 - 239     >/= 240   **All ranges are based on fasting samples. Specific   therapeutic targets will vary based on patient-specific   cardiac risk.  .    Pediatric guidelines reference:Pediatrics 2011, 128(S5).   Adult guidelines reference: NCEP ATPIII Guidelines,     LUIS ALFREDO 2001, 258:5066-04  .   Venipuncture immediately after or during the    administration of Metamizole may lead to falsely   low results. Testing should be performed immediately   prior to Metamizole dosing.    HDL 05/03/2023 39.8 (A)  mg/dL Final    .      AGE      VERY LOW   LOW     NORMAL    HIGH       0-19 Y       < 35   < 40     40-45     ----    20-24 Y       ----   < 40       >45     ----      >24 Y       ----   < 40     40-60      >60  .    Cholesterol/HDL Ratio 05/03/2023 4.9   Final    REF VALUES  DESIRABLE  < 3.4  HIGH RISK  > 5.0    LDL 05/03/2023 139 (H)  0 - 99 mg/dL Final    .                           NEAR      BORD      AGE      DESIRABLE  OPTIMAL    HIGH     HIGH     VERY HIGH     0-19 Y     0 - 109     ---    110-129   >/= 130     ----    20-24 Y     0 - 119     ---    120-159   >/= 160     ----      >24 Y     0 -  99   100-129  130-159   160-189     >/=190  .    VLDL 05/03/2023 16  0 - 40 mg/dL Final    Triglycerides 05/03/2023 81  0 - 149 mg/dL Final    .      AGE      DESIRABLE   BORDERLINE HIGH   HIGH     VERY HIGH   0 D-90 D    19 - 174         ----         ----        ----  91 D- 9 Y     0 -  74        75 -  99     >/= 100      ----    10-19 Y     0 -  89        90 - 129     >/= 130      ----    20-24 Y     0 - 114       115 - 149     >/= 150      ----         >24 Y     0 - 149       150 - 199    200- 499    >/= 500  .   Venipuncture immediately after or during the    administration of Metamizole may lead to falsely   low results. Testing should be performed immediately   prior to Metamizole dosing.    WBC 05/03/2023 6.0  4.4 - 11.3 x10E9/L Final    nRBC 05/03/2023 0.0  0.0 - 0.0 /100 WBC Final    RBC 05/03/2023 4.88  4.00 - 5.20 x10E12/L Final    Hemoglobin 05/03/2023 12.8  12.0 - 16.0 g/dL Final    Hematocrit 05/03/2023 41.8  36.0 - 46.0 % Final    MCV 05/03/2023 86  80 -  100 fL Final    MCHC 05/03/2023 30.6 (L)  32.0 - 36.0 g/dL Final    Platelets 05/03/2023 281  150 - 450 x10E9/L Final    RDW 05/03/2023 13.6  11.5 - 14.5 % Final    Neutrophils % 05/03/2023 55.9  40.0 - 80.0 % Final    Immature Granulocytes %, Automated 05/03/2023 0.2  0.0 - 0.9 % Final     Immature Granulocyte Count (IG) includes promyelocytes,    myelocytes and metamyelocytes but does not include bands.   Percent differential counts (%) should be interpreted in the   context of the absolute cell counts (cells/L).    Lymphocytes % 05/03/2023 32.3  13.0 - 44.0 % Final    Monocytes % 05/03/2023 6.0  2.0 - 10.0 % Final    Eosinophils % 05/03/2023 4.8  0.0 - 6.0 % Final    Basophils % 05/03/2023 0.8  0.0 - 2.0 % Final    Neutrophils Absolute 05/03/2023 3.35  1.20 - 7.70 x10E9/L Final    Lymphocytes Absolute 05/03/2023 1.94  1.20 - 4.80 x10E9/L Final    Monocytes Absolute 05/03/2023 0.36  0.10 - 1.00 x10E9/L Final    Eosinophils Absolute 05/03/2023 0.29  0.00 - 0.70 x10E9/L Final    Basophils Absolute 05/03/2023 0.05  0.00 - 0.10 x10E9/L Final    TSH 05/03/2023 1.09  0.44 - 3.98 mIU/L Final     TSH testing is performed using different testing    methodology at [facility] than at other    [facility]. Direct result comparisons should    only be made within the same method.   Legacy Encounter on 09/28/2022   Component Date Value Ref Range Status    Tobacco Screen, Urine 09/28/2022 NEGATIVE   Final    Comment: Cotinine, a metabolite of nicotine, is measured to screen   for nicotine exposure. The cut-off is set at 300ng/mL to   detect active exposure (smoking).    This test was developed and its performance characteristics   were determined by the [facility].     Legacy Encounter on 07/21/2022   Component Date Value Ref Range Status    WBC 07/21/2022 7.8  4.4 - 11.3 x10E9/L Final    RBC 07/21/2022 4.92  4.00 - 5.20 x10E12/L Final    Hemoglobin 07/21/2022  13.5  12.0 - 16.0 g/dL Final    Hematocrit 07/21/2022 43.2  36.0 - 46.0 % Final    MCV 07/21/2022 88  80 - 100 fL Final    MCHC 07/21/2022 31.3 (L)  32.0 - 36.0 g/dL Final    Platelets 07/21/2022 287  150 - 450 x10E9/L Final    RDW 07/21/2022 13.2  11.5 - 14.5 % Final    Lead 07/21/2022 <0.5  0.0 - 4.9 ug/dL Final    Comment:  The performance characteristics of this test have    been determined by [facility].   This test has not been approved by the FDA; however, the FDA    has determined that such clearance is not necessary.   This test is used for clinical purposes and should not be    regarded as investigational or for research.   This laboratory is certified under CLIA to perform high   complexity clinical laboratory testing.  INTERPRETATION:  0-5 ug/dL     WITHIN NORMAL RANGE FOR AN ADULT.    6-9 ug/dL     ALTHOUGH THIS LEVEL IS SLIGHTLY ABOVE                 NORMAL, THERE ARE NO DEFINITE HEALTH                  EFFECTS  OR SYMPTOMS AT THIS LEVEL.                 PERFORM FOLLOW-UP TESTING 2-3 MONTHS.    10-39 ug/dL   PERFORM FOLLOW-UP TESTING IN 1-3 MONTHS                 CONDUCT OCCUPATIONAL, ENVIRONMENTAL AND                 MEDICAL HISTORY.POSSIBLE HEALTH EFFECTS                AT THIS LEVEL INCLUDE ELEVATED BLOOD                 PRESSURE,EFFECTS ON KIDNEY AND BRAI                           N,                 SPONTANEOUS ABORTIONS AND OTHER                 REPRODUCTIVE EFFECTS. NO ACTION IS                 REQUIRED UNDER THE OSHA STANDARD FOR                THIS LEAD LEVEL.  HOWEVER, UNDER OSHA,                 A PHYSICIAN MAY RECOMMEND A MEDICAL                 EXAMINATIONOF A LEAD-EXPOSED WORKER                 TO ASSESS FOR LEAD EFFECTS.    40-59 ug/dL   CONDUCT HISTORY, PHYSICAL EXAM AND                 CLINICAL ASSESSMENT FOR LEAD POISONING.                  REMOVE FROM LEAD EXPOSURE (OSHA                  REQUIRES REMOVAL IF AVERAGE OF 3                  CONSECUTIVE  BLOOD LEAD LEVELSARE                 50 ug/dL OR MORE).                  PERFORM FOLLOW-UPTESTING IN 2-4 WEEKS.    60-PLUS ug/dL IMMEDIATE REMOVAL FROM LEAD EXPOSURE.                 REFER TO OCCUPATIONAL MEDICINE                 SPECIALIST  FOR EVALUATION AND                   CONSIDERATION OFCHELATION TREATMENT.    REFERENCES:   1.OSHA LEAD STANDARD 1910.1025                2.ASSOCIATION OF OCCUPATIONA                           L AND                   ENVIRONMENTAL CLINICS, MEDICAL                   MANAGEMENT GUIDELINES FOR LEAD-                   EXPOSED ADULTS, ([email or web address] UNDER                   PRINCIPLES AND GUIDANCE.      Glucose 07/21/2022 84  74 - 99 mg/dL Final    Sodium 07/21/2022 139  136 - 145 mmol/L Final    Potassium 07/21/2022 3.8  3.5 - 5.3 mmol/L Final    Chloride 07/21/2022 104  98 - 107 mmol/L Final    Bicarbonate 07/21/2022 27  21 - 32 mmol/L Final    Anion Gap 07/21/2022 12  10 - 20 mmol/L Final    Urea Nitrogen 07/21/2022 8  6 - 23 mg/dL Final    Creatinine 07/21/2022 0.72  0.50 - 1.05 mg/dL Final    GFR Female 07/21/2022 >90  >90 mL/min/1.73m2 Final    Comment:  CALCULATIONS OF ESTIMATED GFR ARE PERFORMED   USING THE 2021 CKD-EPI STUDY REFIT EQUATION   WITHOUT THE RACE VARIABLE FOR THE IDMS-TRACEABLE   CREATININE METHODS.    https://jasn.asnjournals.org/content/early/2021/09/22/ASN.4387870556      Calcium 07/21/2022 9.4  8.6 - 10.3 mg/dL Final    Albumin 07/21/2022 4.8  3.4 - 5.0 g/dL Final    Alkaline Phosphatase 07/21/2022 47  33 - 110 U/L Final    Total Protein 07/21/2022 7.4  6.4 - 8.2 g/dL Final    AST 07/21/2022 14  9 - 39 U/L Final    Total Bilirubin 07/21/2022 0.5  0.0 - 1.2 mg/dL Final    ALT (SGPT) 07/21/2022 10  7 - 45 U/L Final    Comment:  Patients treated with Sulfasalazine may generate    falsely decreased results for ALT.       Current Outpatient Medications on File Prior to Visit   Medication Sig Dispense Refill    hydrOXYzine HCL (Atarax) 25 mg tablet Take 1 tablet (25  mg) by mouth 3 times a day as needed.      vortioxetine (Trintellix) 10 mg tablet tablet Take 1 tablet (10 mg) by mouth once daily. 30 tablet 0     No current facility-administered medications on file prior to visit.     No images are attached to the encounter.            Assessment/Plan   Problem List Items Addressed This Visit          Other    Depression     - Trintelix decrease to every other day for 1 week and then stop  - Referral to Psychiatrist for further evaluation and treatment  - Advised patient to report to ER if her symptoms worsen   - follow up in 2 weeks or sooner if needed          Relevant Orders    Referral to Psychiatry    Generalized anxiety disorder - Primary     - Trintelix decrease to every other day for 1 week and then stop  - Referral to Psychiatrist for further evaluation and treatment  - Advised patient to report to ER if her symptoms worsen   - follow up in 2 weeks or sooner if needed          Relevant Orders    Referral to Psychiatry

## 2023-06-07 NOTE — PATIENT INSTRUCTIONS
Recommend decreasing Trintelix to every other day for 1 week and then stop   We will have you follow up in 2 weeks to let us know how you are feeling  Referral placed to Psychiatrist today

## 2023-06-12 DIAGNOSIS — F41.1 GENERALIZED ANXIETY DISORDER: ICD-10-CM

## 2023-06-12 DIAGNOSIS — F32.A DEPRESSION, UNSPECIFIED DEPRESSION TYPE: ICD-10-CM

## 2023-06-12 RX ORDER — VORTIOXETINE 10 MG/1
TABLET, FILM COATED ORAL
Qty: 30 TABLET | Refills: 0 | Status: SHIPPED | OUTPATIENT
Start: 2023-06-12

## 2023-06-21 ENCOUNTER — APPOINTMENT (OUTPATIENT)
Dept: PRIMARY CARE | Facility: CLINIC | Age: 33
End: 2023-06-21
Payer: COMMERCIAL

## 2024-12-13 ENCOUNTER — APPOINTMENT (OUTPATIENT)
Dept: CARDIOLOGY | Facility: CLINIC | Age: 34
End: 2024-12-13
Payer: COMMERCIAL

## 2024-12-17 ENCOUNTER — APPOINTMENT (OUTPATIENT)
Dept: RADIOLOGY | Facility: HOSPITAL | Age: 34
End: 2024-12-17
Payer: COMMERCIAL

## 2024-12-17 ENCOUNTER — APPOINTMENT (OUTPATIENT)
Dept: CARDIOLOGY | Facility: HOSPITAL | Age: 34
End: 2024-12-17
Payer: COMMERCIAL

## 2024-12-17 ENCOUNTER — HOSPITAL ENCOUNTER (EMERGENCY)
Facility: HOSPITAL | Age: 34
Discharge: HOME | End: 2024-12-17
Payer: COMMERCIAL

## 2024-12-17 VITALS
SYSTOLIC BLOOD PRESSURE: 117 MMHG | OXYGEN SATURATION: 99 % | RESPIRATION RATE: 16 BRPM | BODY MASS INDEX: 23.49 KG/M2 | HEART RATE: 103 BPM | WEIGHT: 155 LBS | HEIGHT: 68 IN | TEMPERATURE: 99.3 F | DIASTOLIC BLOOD PRESSURE: 80 MMHG

## 2024-12-17 DIAGNOSIS — J18.9 PNEUMONIA OF RIGHT LOWER LOBE DUE TO INFECTIOUS ORGANISM: Primary | ICD-10-CM

## 2024-12-17 LAB
ALBUMIN SERPL BCP-MCNC: 4.8 G/DL (ref 3.4–5)
ALP SERPL-CCNC: 92 U/L (ref 33–110)
ALT SERPL W P-5'-P-CCNC: 11 U/L (ref 7–45)
ANION GAP SERPL CALC-SCNC: 11 MMOL/L (ref 10–20)
APPEARANCE UR: CLEAR
AST SERPL W P-5'-P-CCNC: 11 U/L (ref 9–39)
BASOPHILS # BLD MANUAL: 0 X10*3/UL (ref 0–0.1)
BASOPHILS NFR BLD MANUAL: 0 %
BILIRUB SERPL-MCNC: 0.3 MG/DL (ref 0–1.2)
BILIRUB UR STRIP.AUTO-MCNC: NEGATIVE MG/DL
BUN SERPL-MCNC: 11 MG/DL (ref 6–23)
CALCIUM SERPL-MCNC: 9.7 MG/DL (ref 8.6–10.3)
CARDIAC TROPONIN I PNL SERPL HS: 6 NG/L (ref 0–13)
CHLORIDE SERPL-SCNC: 105 MMOL/L (ref 98–107)
CO2 SERPL-SCNC: 23 MMOL/L (ref 21–32)
COLOR UR: COLORLESS
CREAT SERPL-MCNC: 0.79 MG/DL (ref 0.5–1.05)
EGFRCR SERPLBLD CKD-EPI 2021: >90 ML/MIN/1.73M*2
EOSINOPHIL # BLD MANUAL: 0 X10*3/UL (ref 0–0.7)
EOSINOPHIL NFR BLD MANUAL: 0 %
ERYTHROCYTE [DISTWIDTH] IN BLOOD BY AUTOMATED COUNT: 13.7 % (ref 11.5–14.5)
FLUAV RNA RESP QL NAA+PROBE: NOT DETECTED
FLUBV RNA RESP QL NAA+PROBE: NOT DETECTED
GLUCOSE SERPL-MCNC: 93 MG/DL (ref 74–99)
GLUCOSE UR STRIP.AUTO-MCNC: NORMAL MG/DL
HCG UR QL IA.RAPID: NEGATIVE
HCT VFR BLD AUTO: 40.2 % (ref 36–46)
HGB BLD-MCNC: 13 G/DL (ref 12–16)
IMM GRANULOCYTES # BLD AUTO: 0.03 X10*3/UL (ref 0–0.7)
IMM GRANULOCYTES NFR BLD AUTO: 0.3 % (ref 0–0.9)
KETONES UR STRIP.AUTO-MCNC: NEGATIVE MG/DL
LEUKOCYTE ESTERASE UR QL STRIP.AUTO: NEGATIVE
LYMPHOCYTES # BLD MANUAL: 1.52 X10*3/UL (ref 1.2–4.8)
LYMPHOCYTES NFR BLD MANUAL: 15 %
MCH RBC QN AUTO: 26.8 PG (ref 26–34)
MCHC RBC AUTO-ENTMCNC: 32.3 G/DL (ref 32–36)
MCV RBC AUTO: 83 FL (ref 80–100)
MONOCYTES # BLD MANUAL: 0.4 X10*3/UL (ref 0.1–1)
MONOCYTES NFR BLD MANUAL: 4 %
NEUTS SEG # BLD MANUAL: 8.18 X10*3/UL (ref 1.2–7)
NEUTS SEG NFR BLD MANUAL: 81 %
NITRITE UR QL STRIP.AUTO: NEGATIVE
NRBC BLD-RTO: 0 /100 WBCS (ref 0–0)
OVALOCYTES BLD QL SMEAR: ABNORMAL
PH UR STRIP.AUTO: 6.5 [PH]
PLATELET # BLD AUTO: 343 X10*3/UL (ref 150–450)
POTASSIUM SERPL-SCNC: 4.1 MMOL/L (ref 3.5–5.3)
PROT SERPL-MCNC: 7.2 G/DL (ref 6.4–8.2)
PROT UR STRIP.AUTO-MCNC: NEGATIVE MG/DL
RBC # BLD AUTO: 4.85 X10*6/UL (ref 4–5.2)
RBC # UR STRIP.AUTO: NEGATIVE /UL
RBC MORPH BLD: ABNORMAL
RSV RNA RESP QL NAA+PROBE: NOT DETECTED
SARS-COV-2 RNA RESP QL NAA+PROBE: NOT DETECTED
SODIUM SERPL-SCNC: 135 MMOL/L (ref 136–145)
SP GR UR STRIP.AUTO: 1
TOTAL CELLS COUNTED BLD: 100
UROBILINOGEN UR STRIP.AUTO-MCNC: NORMAL MG/DL
WBC # BLD AUTO: 10.1 X10*3/UL (ref 4.4–11.3)

## 2024-12-17 PROCEDURE — 71275 CT ANGIOGRAPHY CHEST: CPT | Performed by: STUDENT IN AN ORGANIZED HEALTH CARE EDUCATION/TRAINING PROGRAM

## 2024-12-17 PROCEDURE — 71045 X-RAY EXAM CHEST 1 VIEW: CPT

## 2024-12-17 PROCEDURE — 71275 CT ANGIOGRAPHY CHEST: CPT

## 2024-12-17 PROCEDURE — 81003 URINALYSIS AUTO W/O SCOPE: CPT | Performed by: STUDENT IN AN ORGANIZED HEALTH CARE EDUCATION/TRAINING PROGRAM

## 2024-12-17 PROCEDURE — 80053 COMPREHEN METABOLIC PANEL: CPT | Performed by: STUDENT IN AN ORGANIZED HEALTH CARE EDUCATION/TRAINING PROGRAM

## 2024-12-17 PROCEDURE — 81025 URINE PREGNANCY TEST: CPT | Performed by: PHYSICIAN ASSISTANT

## 2024-12-17 PROCEDURE — 93005 ELECTROCARDIOGRAM TRACING: CPT

## 2024-12-17 PROCEDURE — 84484 ASSAY OF TROPONIN QUANT: CPT | Performed by: PHYSICIAN ASSISTANT

## 2024-12-17 PROCEDURE — 99285 EMERGENCY DEPT VISIT HI MDM: CPT | Mod: 25

## 2024-12-17 PROCEDURE — 71045 X-RAY EXAM CHEST 1 VIEW: CPT | Performed by: RADIOLOGY

## 2024-12-17 PROCEDURE — 36415 COLL VENOUS BLD VENIPUNCTURE: CPT | Performed by: STUDENT IN AN ORGANIZED HEALTH CARE EDUCATION/TRAINING PROGRAM

## 2024-12-17 PROCEDURE — 2550000001 HC RX 255 CONTRASTS: Performed by: PHYSICIAN ASSISTANT

## 2024-12-17 PROCEDURE — 87637 SARSCOV2&INF A&B&RSV AMP PRB: CPT | Performed by: PHYSICIAN ASSISTANT

## 2024-12-17 PROCEDURE — 85007 BL SMEAR W/DIFF WBC COUNT: CPT | Performed by: STUDENT IN AN ORGANIZED HEALTH CARE EDUCATION/TRAINING PROGRAM

## 2024-12-17 PROCEDURE — 85027 COMPLETE CBC AUTOMATED: CPT | Performed by: STUDENT IN AN ORGANIZED HEALTH CARE EDUCATION/TRAINING PROGRAM

## 2024-12-17 RX ORDER — ALBUTEROL SULFATE 90 UG/1
1-2 INHALANT RESPIRATORY (INHALATION) EVERY 6 HOURS PRN
Qty: 18 G | Refills: 0 | Status: SHIPPED | OUTPATIENT
Start: 2024-12-17 | End: 2025-01-16

## 2024-12-17 RX ORDER — AZITHROMYCIN 500 MG/1
500 TABLET, FILM COATED ORAL DAILY
Qty: 5 TABLET | Refills: 0 | Status: SHIPPED | OUTPATIENT
Start: 2024-12-17 | End: 2024-12-22

## 2024-12-17 ASSESSMENT — COLUMBIA-SUICIDE SEVERITY RATING SCALE - C-SSRS
1. IN THE PAST MONTH, HAVE YOU WISHED YOU WERE DEAD OR WISHED YOU COULD GO TO SLEEP AND NOT WAKE UP?: NO
6. HAVE YOU EVER DONE ANYTHING, STARTED TO DO ANYTHING, OR PREPARED TO DO ANYTHING TO END YOUR LIFE?: NO
2. HAVE YOU ACTUALLY HAD ANY THOUGHTS OF KILLING YOURSELF?: NO

## 2024-12-17 ASSESSMENT — PAIN SCALES - GENERAL: PAINLEVEL_OUTOF10: 4

## 2024-12-17 ASSESSMENT — PAIN - FUNCTIONAL ASSESSMENT: PAIN_FUNCTIONAL_ASSESSMENT: 0-10

## 2024-12-17 ASSESSMENT — PAIN DESCRIPTION - LOCATION: LOCATION: CHEST

## 2024-12-18 LAB
ATRIAL RATE: 109 BPM
HOLD SPECIMEN: NORMAL
P AXIS: 17 DEGREES
PR INTERVAL: 204 MS
Q ONSET: 252 MS
QRS COUNT: 18 BEATS
QRS DURATION: 72 MS
QT INTERVAL: 301 MS
QTC CALCULATION(BAZETT): 409 MS
QTC FREDERICIA: 369 MS
R AXIS: 20 DEGREES
T AXIS: 46 DEGREES
T OFFSET: 403 MS
VENTRICULAR RATE: 111 BPM

## 2024-12-18 NOTE — ED PROVIDER NOTES
EMERGENCY MEDICINE EVALUATION NOTE    History of Present Illness     Chief Complaint:   Chief Complaint   Patient presents with    Shortness of Breath       HPI: Ayesha Mata is a 34 y.o. female presents with a chief complaint of shortness of breath and dizziness.  Patient reports that her symptoms started earlier today.  She states that today is gone on to come slightly more short of breath.  Patient reports she also has a cough that is nonproductive.  Patient denies any history of any cardiac disease.  She denies a history of the lung disease.  Patient states that she has a little bit of chills and bodyaches but denies any fever.  Patient denies any hemoptysis.  She denies any recent travel, surgery lacerated days, or unilateral leg swelling.    Previous History     Past Medical History:   Diagnosis Date    Anxiety disorder, unspecified     Anxiety and depression    Contact with and (suspected) exposure to lead 07/21/2022    History of lead exposure    Depression     Other ventricular tachycardia (Multi)     CPVT (catecholaminergic polymorphic ventricular tachycardia)    Personal history of diseases of the blood and blood-forming organs and certain disorders involving the immune mechanism 07/21/2022    History of iron deficiency anemia     Past Surgical History:   Procedure Laterality Date    OTHER SURGICAL HISTORY  07/21/2022    Catheter ablation    OTHER SURGICAL HISTORY  07/21/2022    Cardioverter defibrillator insertion     Social History     Tobacco Use    Smoking status: Never     Passive exposure: Never    Smokeless tobacco: Never   Substance Use Topics    Alcohol use: Yes     Comment: social    Drug use: Never     Family History   Problem Relation Name Age of Onset    Other (Anxiety and Depression) Mother      Hypertension Mother      Breast cancer Mother      Other (HLD) Mother      Other (HLD) Father      Other (HTN) Father      Aortic aneurysm Father       Allergies   Allergen Reactions    Penicillins  Other     Gatrointestinal upset    Adhesive Tape-Silicones Rash     Band aids    Latex Rash     Current Outpatient Medications   Medication Instructions    albuterol 90 mcg/actuation inhaler 1-2 puffs, inhalation, Every 6 hours PRN    azithromycin (ZITHROMAX) 500 mg, oral, Daily    hydrOXYzine HCL (ATARAX) 25 mg, oral, 3 times daily PRN    Trintellix 10 mg tablet tablet TAKE 1 TABLET BY MOUTH EVERY DAY       Physical Exam     Appearance: Alert, oriented , cooperative,  in no acute distress.      Skin: Intact,  dry skin, no lesions, rash, petechiae or purpura.      Eyes: PERRLA, EOMs intact,  Conjunctiva pink      ENT: Hearing grossly intact. Pharynx clear     Neck: Supple. Trachea at midline.      Pulmonary: Clear bilaterally. No rales, rhonchi or wheezing. No accessory muscle use or stridor.     Cardiac: Tachycardic.     Abdomen: Soft, nontender, active bowel sounds.     Musculoskeletal: Full range of motion.      Neurological:Cranial nerves II through XII are grossly intact, normal sensation, no weakness, no focal findings identified.     Results     Labs Reviewed   COMPREHENSIVE METABOLIC PANEL - Abnormal       Result Value    Glucose 93      Sodium 135 (*)     Potassium 4.1      Chloride 105      Bicarbonate 23      Anion Gap 11      Urea Nitrogen 11      Creatinine 0.79      eGFR >90      Calcium 9.7      Albumin 4.8      Alkaline Phosphatase 92      Total Protein 7.2      AST 11      Bilirubin, Total 0.3      ALT 11     URINALYSIS WITH REFLEX CULTURE AND MICROSCOPIC - Abnormal    Color, Urine Colorless (*)     Appearance, Urine Clear      Specific Gravity, Urine 1.004 (*)     pH, Urine 6.5      Protein, Urine NEGATIVE      Glucose, Urine Normal      Blood, Urine NEGATIVE      Ketones, Urine NEGATIVE      Bilirubin, Urine NEGATIVE      Urobilinogen, Urine Normal      Nitrite, Urine NEGATIVE      Leukocyte Esterase, Urine NEGATIVE     MANUAL DIFFERENTIAL - Abnormal    Neutrophils %, Manual 81.0      Lymphocytes  %, Manual 15.0      Monocytes %, Manual 4.0      Eosinophils %, Manual 0.0      Basophils %, Manual 0.0      Seg Neutrophils Absolute, Manual 8.18 (*)     Lymphocytes Absolute, Manual 1.52      Monocytes Absolute, Manual 0.40      Eosinophils Absolute, Manual 0.00      Basophils Absolute, Manual 0.00      Total Cells Counted 100      RBC Morphology See Below      Ovalocytes Few     CBC WITH AUTO DIFFERENTIAL - Normal    WBC 10.1      nRBC 0.0      RBC 4.85      Hemoglobin 13.0      Hematocrit 40.2      MCV 83      MCH 26.8      MCHC 32.3      RDW 13.7      Platelets 343      Immature Granulocytes %, Automated 0.3      Immature Granulocytes Absolute, Automated 0.03      Narrative:     The previously reported component Neutrophils % is no longer being reported.  The previously reported component Lymphocytes % is no longer being reported.  The previously reported component Monocytes % is no longer being reported.  The previously   reported component Eosinophils % is no longer being reported.  The previously reported component Basophils % is no longer being reported.  The previously reported component Absolute Neutrophils is no longer being reported.  The previously reported   component Absolute Lymphocytes is no longer being reported.  The previously reported component Absolute Monocytes is no longer being reported.  The previously reported component Absolute Eosinophils is no longer being reported.  The previously reported   component Absolute Basophils is no longer being reported.   HCG, URINE, QUALITATIVE - Normal    HCG, Urine NEGATIVE     INFLUENZA A AND B PCR - Normal    Flu A Result Not Detected      Flu B Result Not Detected      Narrative:     This assay is an in vitro diagnostic multiplex nucleic acid amplification test for the detection and discrimination of Influenza A & B from nasopharyngeal specimens, and has been validated for use at [facility]. Negative results do not preclude  Influenza A/B infections, and should not be used as the sole basis for diagnosis, treatment, or other management decisions. If Influenza A/B and RSV PCR results are negative, testing for Parainfluenza virus, Adenovirus and Metapneumovirus is routinely performed for [facility] pediatric oncology and intensive care inpatients, and is available on other patients by placing an add-on request.   SARS-COV-2 PCR - Normal    Coronavirus 2019, PCR Not Detected      Narrative:     This assay has received FDA Emergency Use Authorization (EUA) and is only authorized for the duration of time that circumstances exist to justify the authorization of the emergency use of in vitro diagnostic tests for the detection of SARS-CoV-2 virus and/or diagnosis of COVID-19 infection under section 564(b)(1) of the Act, 21 U.S.C. 360bbb-3(b)(1). This assay is an in vitro diagnostic nucleic acid amplification test for the qualitative detection of SARS-CoV-2 from nasopharyngeal specimens and has been validated for use at [facility]. Negative results do not preclude COVID-19 infections and should not be used as the sole basis for diagnosis, treatment, or other management decisions.     RSV PCR - Normal    RSV PCR Not Detected      Narrative:     This assay is an FDA-cleared, in vitro diagnostic nucleic acid amplification test for the detection of RSV from nasopharyngeal specimens, and has been validated for use at [facility]. Negative results do not preclude RSV infections, and should not be used as the sole basis for diagnosis, treatment, or other management decisions. If Influenza A/B and RSV PCR results are negative, testing for Parainfluenza virus, Adenovirus and Metapneumovirus is routinely performed for pediatric oncology and intensive care inpatients at [facility], and is available on other patients by placing an add-on request.       TROPONIN I, HIGH SENSITIVITY - Normal    Troponin I, High Sensitivity 6       Narrative:     Less than 99th percentile of normal range cutoff-  Female and children under 18 years old <14 ng/L; Male <21 ng/L: Negative  Repeat testing should be performed if clinically indicated.     Female and children under 18 years old 14-50 ng/L; Male 21-50 ng/L:  Consistent with possible cardiac damage and possible increased clinical   risk. Serial measurements may help to assess extent of myocardial damage.     >50 ng/L: Consistent with cardiac damage, increased clinical risk and  myocardial infarction. Serial measurements may help assess extent of   myocardial damage.      NOTE: Children less than 1 year old may have higher baseline troponin   levels and results should be interpreted in conjunction with the overall   clinical context.     NOTE: Troponin I testing is performed using a different   testing methodology at [facility] than at other   [facility]. Direct result comparisons should only   be made within the same method.   URINALYSIS WITH REFLEX CULTURE AND MICROSCOPIC    Narrative:     The following orders were created for panel order Urinalysis with Reflex Culture and Microscopic.  Procedure                               Abnormality         Status                     ---------                               -----------         ------                     Urinalysis with Reflex C...[[id number]]  Abnormal            Final result               Extra Urine Gray Tube[[id number]]                            In process                   Please view results for these tests on the individual orders.   EXTRA URINE GRAY TUBE     CT angio chest for pulmonary embolism   Final Result   1. No evidence of acute pulmonary embolism.   2. Patchy airspace infiltrates are present in the medial aspect of   the right lower lobe suggestive of developing pneumonia, with a mild   wall thickening in the adjacent smaller airways, possibly   representing component of bronchiolitis. Correlate with clinical  "  symptoms.        MACRO:   None        Signed by: Antoinette Lazo 12/17/2024 11:33 PM   Dictation workstation:   [id number]      XR chest 1 view   Final Result   No radiographic evidence of acute cardiopulmonary pathology.        MACRO:   None.        Signed by: Evan Finkelstein 12/17/2024 9:40 PM   Dictation workstation:   [id number]            ED Course & Medical Decision Making     Medications   iohexol (OMNIPaque) 350 mg iodine/mL solution 75 mL (75 mL intravenous Given 12/17/24 2309)     Heart Rate:  [130]   Temperature:  [37.2 °C (99 °F)]   Respirations:  [16]   BP: (115)/(77)   Height:  [172.7 cm (5' 8\")]   Weight:  [70.3 kg (155 lb)]   Pulse Ox:  [97 %]    ED Course as of 12/17/24 2347   Tue Dec 17, 2024   2200 Discussed plan of care with the patient.  Patient still slightly tachycardic.  Patient will have viral swabs added on at this time as well CT PE study to ensure that there is no acute pneumonia that was missed on chest x-ray and no PE present.  Patient is in agreement the plan of care. [CJ]   2306 EKG performed 12/17/2024 at 10:57 PM.  Sinus tachycardia with a ventricular to 111 bpm, NE interval of 204 ms, QT/QTc of 301/409 ms.  No STEMI. [CJ]   2345 Updated patient on the results.  Patient CT PE study did not show any acute PEs however does show right lower lobe pneumonia.  At this time patient be discharged home with treatment for pneumonia.  Patient will be given Zithromax as well as an inhaler for home, as she is allergic to penicillins.  Patient is in agreement the plan of care of discharge at this time.  She was encouraged to follow-up with primary care provider in 1 to 2 days return here immediately with any worsening symptoms. [CJ]      ED Course User Index  [CJ] Josh Nieves PA-C         Diagnoses as of 12/17/24 2347   Pneumonia of right lower lobe due to infectious organism       Procedures   Procedures    Diagnosis     1. Pneumonia of right lower lobe due to infectious organism  "       Disposition   Discharged    ED Prescriptions       Medication Sig Dispense Start Date End Date Auth. Provider    azithromycin (Zithromax) 500 mg tablet Take 1 tablet (500 mg) by mouth once daily for 5 days. 5 tablet 12/17/2024 12/22/2024 Josh Nieves PA-C    albuterol 90 mcg/actuation inhaler Inhale 1-2 puffs every 6 hours if needed for wheezing. 18 g 12/17/2024 1/16/2025 Josh Nieves PA-C            Disclaimer: This note was dictated by speech recognition. Minor errors in transcription may be present. Please call if questions.       Josh Nieves PA-C  12/17/24 0173

## 2024-12-18 NOTE — ED TRIAGE NOTES
Patient presents to the ED with c/o SOB, dizziness and a cough.  Her symptoms continued to worsen today and thinks she may have pneumonia.

## 2025-03-12 ENCOUNTER — HOSPITAL ENCOUNTER (EMERGENCY)
Facility: HOSPITAL | Age: 35
Discharge: HOME | End: 2025-03-12
Payer: COMMERCIAL

## 2025-03-12 ENCOUNTER — APPOINTMENT (OUTPATIENT)
Dept: RADIOLOGY | Facility: HOSPITAL | Age: 35
End: 2025-03-12
Payer: COMMERCIAL

## 2025-03-12 VITALS
HEART RATE: 86 BPM | OXYGEN SATURATION: 99 % | DIASTOLIC BLOOD PRESSURE: 81 MMHG | BODY MASS INDEX: 24.25 KG/M2 | HEIGHT: 68 IN | RESPIRATION RATE: 18 BRPM | TEMPERATURE: 97.6 F | WEIGHT: 160 LBS | SYSTOLIC BLOOD PRESSURE: 123 MMHG

## 2025-03-12 DIAGNOSIS — G43.B0 OPHTHALMOPLEGIC MIGRAINE, NOT INTRACTABLE: Primary | ICD-10-CM

## 2025-03-12 PROCEDURE — 96361 HYDRATE IV INFUSION ADD-ON: CPT

## 2025-03-12 PROCEDURE — 70450 CT HEAD/BRAIN W/O DYE: CPT

## 2025-03-12 PROCEDURE — 96375 TX/PRO/DX INJ NEW DRUG ADDON: CPT

## 2025-03-12 PROCEDURE — 99284 EMERGENCY DEPT VISIT MOD MDM: CPT | Mod: 25

## 2025-03-12 PROCEDURE — 70450 CT HEAD/BRAIN W/O DYE: CPT | Performed by: RADIOLOGY

## 2025-03-12 PROCEDURE — 96374 THER/PROPH/DIAG INJ IV PUSH: CPT

## 2025-03-12 PROCEDURE — 2500000004 HC RX 250 GENERAL PHARMACY W/ HCPCS (ALT 636 FOR OP/ED)

## 2025-03-12 RX ORDER — DIPHENHYDRAMINE HYDROCHLORIDE 50 MG/ML
50 INJECTION INTRAMUSCULAR; INTRAVENOUS ONCE
Status: COMPLETED | OUTPATIENT
Start: 2025-03-12 | End: 2025-03-12

## 2025-03-12 RX ORDER — METOCLOPRAMIDE HYDROCHLORIDE 5 MG/ML
10 INJECTION INTRAMUSCULAR; INTRAVENOUS ONCE
Status: COMPLETED | OUTPATIENT
Start: 2025-03-12 | End: 2025-03-12

## 2025-03-12 RX ORDER — KETOROLAC TROMETHAMINE 30 MG/ML
30 INJECTION, SOLUTION INTRAMUSCULAR; INTRAVENOUS ONCE
Status: COMPLETED | OUTPATIENT
Start: 2025-03-12 | End: 2025-03-12

## 2025-03-12 RX ORDER — ONDANSETRON 4 MG/1
4 TABLET, ORALLY DISINTEGRATING ORAL EVERY 8 HOURS PRN
Qty: 20 TABLET | Refills: 0 | Status: SHIPPED | OUTPATIENT
Start: 2025-03-12 | End: 2025-03-19

## 2025-03-12 RX ADMIN — DIPHENHYDRAMINE HYDROCHLORIDE 50 MG: 50 INJECTION, SOLUTION INTRAMUSCULAR; INTRAVENOUS at 09:46

## 2025-03-12 RX ADMIN — KETOROLAC TROMETHAMINE 30 MG: 30 INJECTION, SOLUTION INTRAMUSCULAR at 09:46

## 2025-03-12 RX ADMIN — METOCLOPRAMIDE 10 MG: 5 INJECTION, SOLUTION INTRAMUSCULAR; INTRAVENOUS at 09:46

## 2025-03-12 RX ADMIN — SODIUM CHLORIDE 500 ML: 0.9 INJECTION, SOLUTION INTRAVENOUS at 09:47

## 2025-03-12 ASSESSMENT — LIFESTYLE VARIABLES
TOTAL SCORE: 0
HAVE YOU EVER FELT YOU SHOULD CUT DOWN ON YOUR DRINKING: NO
EVER HAD A DRINK FIRST THING IN THE MORNING TO STEADY YOUR NERVES TO GET RID OF A HANGOVER: NO
EVER FELT BAD OR GUILTY ABOUT YOUR DRINKING: NO
HAVE PEOPLE ANNOYED YOU BY CRITICIZING YOUR DRINKING: NO

## 2025-03-12 ASSESSMENT — COLUMBIA-SUICIDE SEVERITY RATING SCALE - C-SSRS
2. HAVE YOU ACTUALLY HAD ANY THOUGHTS OF KILLING YOURSELF?: NO
6. HAVE YOU EVER DONE ANYTHING, STARTED TO DO ANYTHING, OR PREPARED TO DO ANYTHING TO END YOUR LIFE?: NO
1. IN THE PAST MONTH, HAVE YOU WISHED YOU WERE DEAD OR WISHED YOU COULD GO TO SLEEP AND NOT WAKE UP?: NO

## 2025-03-12 ASSESSMENT — PAIN SCALES - GENERAL
PAINLEVEL_OUTOF10: 1
PAINLEVEL_OUTOF10: 8
PAINLEVEL_OUTOF10: 8
PAINLEVEL_OUTOF10: 1

## 2025-03-12 ASSESSMENT — PAIN - FUNCTIONAL ASSESSMENT
PAIN_FUNCTIONAL_ASSESSMENT: 0-10

## 2025-03-12 ASSESSMENT — PAIN DESCRIPTION - PROGRESSION: CLINICAL_PROGRESSION: RESOLVED

## 2025-03-12 ASSESSMENT — PAIN DESCRIPTION - PAIN TYPE: TYPE: ACUTE PAIN

## 2025-03-12 ASSESSMENT — PAIN DESCRIPTION - LOCATION: LOCATION: HEAD

## 2025-03-12 ASSESSMENT — PAIN DESCRIPTION - DESCRIPTORS: DESCRIPTORS: HEADACHE

## 2025-03-12 ASSESSMENT — PAIN DESCRIPTION - FREQUENCY: FREQUENCY: CONSTANT/CONTINUOUS

## 2025-03-12 NOTE — ED PROVIDER NOTES
Chief Complaint   Patient presents with    Headache     Pt reports a severe headache over a week and a half that is on and off. Pt reports that is has gotten worse last night. Pt reports intermittent light sensitivity with the headache as well. Pt reports that the headache gets worse when laying down       34-year-old female arrives to the emergency department chief complaint of an intractable headache/migraine.  Patient does have history of migraine with an aura, states that she did not get her aura, and that this is an atypical type of headache.  The patient states that it has been for 10 days, the pain waxes and wanes primarily on the right side, with the pain now behind her eye causing a pressure.  Patient endorses periods of generalized blurred vision when the pain reaches a 10 out of 10.  On initial assessment the patient denies any vision changes, states that the pain is a 7 out of 10.  The patient is currently breast-feeding and has 3 young children.  Patient denies any other symptoms or complaints.  Denies any abrupt onset of the headache, states that it has been gradual.      History provided by:  Patient   used: No         PmHx, PsHx, Allergies, Family Hx, social Hx reviewed as documented    A complete 10 point review of systems was performed and is negative except for as mentioned in the HPI.    Physical Exam:    General: Patient is AAOx3, appears well developed, well nourished, is a good historian, answers questions appropriately    HEENT: Headache with pressure behind right eye per HPI, atraumatic, PERRLA, EOMs intact, oropharynx without erythema or exudate, buccal mucosa intact without lesions, TMs unremarkable, nose is patent bilateral    Neck: supple, full ROM, negative for lymphadenopathy, JVD, thyromegaly, tracheal deviation, nuccal rigidity    Pulmonary: CTAB, no accessory muscle use, able to speak full clear sentences    Cardiac: HRRR, no murmurs, rubs or gallops    GI:  soft, non-tender, non-distended, BS + x 4, no masses or organomegaly, no guarding or CVA tenderness noted, negative beard's, mcburney's    Musculoskeletal: full weight bearing, RAMIREZ, no joint effusions, clubbing or edema noted    Skin: intact, no lesions or rashes noted, turgor is good.    Neuro: patient follow commands, cranial nerves 2-12 grossly intact, motor strengths 5/5 upper and lower extremities, DTR's and sensation are symmetrical. No focal deficits.    Rectal/: No urinary burning, urgency, change in frequency.  Patient has no rectal complaints        Medical Decision Making  This patient was seen in the emergency department with an attending physician available at all times throughout their ED course    Primary concern for this patient is a recurrence of her known migraine headaches of the atypical nature, however other concern for the patient given that she has never had a headache like this causing this specific pain would be an intracranial abnormality, through shared decision making, a CT of the head will be used to further evaluate.  Other consideration for the patient would be a severe acute sinusitis that we will also be evaluated on head CT.    The patient given 1 L of normal saline, 30 mg of IV Toradol, 10 mg of IV Reglan, 50 mg of IV Benadryl for their symptoms    The patient CT scan is negative for any acute abnormality, negative for any acute sinusitis.  On reassessment of the patient, she states that her headache symptoms have relieved, patient requesting discharge at this time.  Patient states that with her headache she has been having nausea at home, patient given a as needed prescription of Zofran.  Patient also given referral for neurology for outpatient follow-up given the increased frequency of her migraine headaches    Patient is amenable to the plan of discharge as outlined above, all patient's questions pertaining to their ED course were answered in their entirety.  Strict return  precautions were discussed with the patient and they verbalized understanding.  Further, it was made clear to the patient that from an emergent basis, all effort and testing was done to eliminate any imminent dangerous or potentially dangerous conditions of the patient however if their symptoms get much worse or feel life-threatening, they are to return to the emergency department or call 911 immediately.    Amount and/or Complexity of Data Reviewed  Radiology: ordered. Decision-making details documented in ED Course.       Diagnoses as of 03/12/25 1227   Ophthalmoplegic migraine, not intractable       The patient has had the following imaging during this ER visit: CT HEAD WO IV CONTRAST     Patient History   Past Medical History:   Diagnosis Date    Anxiety disorder, unspecified     Anxiety and depression    Contact with and (suspected) exposure to lead 07/21/2022    History of lead exposure    Depression     Other ventricular tachycardia (Multi)     CPVT (catecholaminergic polymorphic ventricular tachycardia)    Personal history of diseases of the blood and blood-forming organs and certain disorders involving the immune mechanism 07/21/2022    History of iron deficiency anemia     Past Surgical History:   Procedure Laterality Date    OTHER SURGICAL HISTORY  07/21/2022    Catheter ablation    OTHER SURGICAL HISTORY  07/21/2022    Cardioverter defibrillator insertion     Family History   Problem Relation Name Age of Onset    Other (Anxiety and Depression) Mother      Hypertension Mother      Breast cancer Mother      Other (HLD) Mother      Other (HLD) Father      Other (HTN) Father      Aortic aneurysm Father       Social History     Tobacco Use    Smoking status: Never     Passive exposure: Never    Smokeless tobacco: Never   Substance Use Topics    Alcohol use: Yes     Comment: social    Drug use: Never       ED Triage Vitals [03/12/25 0905]   Temperature Heart Rate Respirations BP   36.4 °C (97.6 °F) 86 18  "123/81      Pulse Ox Temp Source Heart Rate Source Patient Position   99 % Temporal Monitor --      BP Location FiO2 (%)     -- --       Vitals:    03/12/25 0905   BP: 123/81   Pulse: 86   Resp: 18   Temp: 36.4 °C (97.6 °F)   TempSrc: Temporal   SpO2: 99%   Weight: 72.6 kg (160 lb)   Height: 1.727 m (5' 8\")               BARRERA Mendieta-CNP  03/12/25 1227    "

## 2025-04-01 ENCOUNTER — HOSPITAL ENCOUNTER (INPATIENT)
Facility: HOSPITAL | Age: 35
LOS: 2 days | Discharge: HOME | End: 2025-04-03
Attending: EMERGENCY MEDICINE | Admitting: STUDENT IN AN ORGANIZED HEALTH CARE EDUCATION/TRAINING PROGRAM
Payer: COMMERCIAL

## 2025-04-01 DIAGNOSIS — Z95.0 PACEMAKER: ICD-10-CM

## 2025-04-01 DIAGNOSIS — G93.2 IIH (IDIOPATHIC INTRACRANIAL HYPERTENSION): Primary | ICD-10-CM

## 2025-04-01 DIAGNOSIS — H53.2 DIPLOPIA: ICD-10-CM

## 2025-04-01 DIAGNOSIS — Z95.810 CARDIAC DEFIBRILLATOR IN PLACE: ICD-10-CM

## 2025-04-01 LAB
ALBUMIN SERPL BCP-MCNC: 5.3 G/DL (ref 3.4–5)
ALP SERPL-CCNC: 72 U/L (ref 33–110)
ALT SERPL W P-5'-P-CCNC: 7 U/L (ref 7–45)
ANION GAP SERPL CALC-SCNC: 16 MMOL/L (ref 10–20)
ANION GAP SERPL CALC-SCNC: 19 MMOL/L (ref 10–20)
AST SERPL W P-5'-P-CCNC: 10 U/L (ref 9–39)
BASOPHILS # BLD AUTO: 0.06 X10*3/UL (ref 0–0.1)
BASOPHILS # BLD AUTO: 0.06 X10*3/UL (ref 0–0.1)
BASOPHILS NFR BLD AUTO: 0.7 %
BASOPHILS NFR BLD AUTO: 0.7 %
BILIRUB SERPL-MCNC: 0.5 MG/DL (ref 0–1.2)
BUN SERPL-MCNC: 8 MG/DL (ref 6–23)
BUN SERPL-MCNC: 8 MG/DL (ref 6–23)
CALCIUM SERPL-MCNC: 10 MG/DL (ref 8.6–10.6)
CALCIUM SERPL-MCNC: 10.6 MG/DL (ref 8.6–10.6)
CHLORIDE SERPL-SCNC: 101 MMOL/L (ref 98–107)
CHLORIDE SERPL-SCNC: 104 MMOL/L (ref 98–107)
CO2 SERPL-SCNC: 22 MMOL/L (ref 21–32)
CO2 SERPL-SCNC: 24 MMOL/L (ref 21–32)
CREAT SERPL-MCNC: 0.7 MG/DL (ref 0.5–1.05)
CREAT SERPL-MCNC: 0.75 MG/DL (ref 0.5–1.05)
EGFRCR SERPLBLD CKD-EPI 2021: >90 ML/MIN/1.73M*2
EGFRCR SERPLBLD CKD-EPI 2021: >90 ML/MIN/1.73M*2
EOSINOPHIL # BLD AUTO: 0.05 X10*3/UL (ref 0–0.7)
EOSINOPHIL # BLD AUTO: 0.07 X10*3/UL (ref 0–0.7)
EOSINOPHIL NFR BLD AUTO: 0.6 %
EOSINOPHIL NFR BLD AUTO: 0.9 %
ERYTHROCYTE [DISTWIDTH] IN BLOOD BY AUTOMATED COUNT: 13.2 % (ref 11.5–14.5)
ERYTHROCYTE [DISTWIDTH] IN BLOOD BY AUTOMATED COUNT: 13.2 % (ref 11.5–14.5)
GLUCOSE SERPL-MCNC: 75 MG/DL (ref 74–99)
GLUCOSE SERPL-MCNC: 80 MG/DL (ref 74–99)
HCT VFR BLD AUTO: 40.1 % (ref 36–46)
HCT VFR BLD AUTO: 43.5 % (ref 36–46)
HGB BLD-MCNC: 13.1 G/DL (ref 12–16)
HGB BLD-MCNC: 14.3 G/DL (ref 12–16)
IMM GRANULOCYTES # BLD AUTO: 0.01 X10*3/UL (ref 0–0.7)
IMM GRANULOCYTES # BLD AUTO: 0.03 X10*3/UL (ref 0–0.7)
IMM GRANULOCYTES NFR BLD AUTO: 0.1 % (ref 0–0.9)
IMM GRANULOCYTES NFR BLD AUTO: 0.4 % (ref 0–0.9)
LYMPHOCYTES # BLD AUTO: 1.5 X10*3/UL (ref 1.2–4.8)
LYMPHOCYTES # BLD AUTO: 1.56 X10*3/UL (ref 1.2–4.8)
LYMPHOCYTES NFR BLD AUTO: 17.9 %
LYMPHOCYTES NFR BLD AUTO: 19.4 %
MAGNESIUM SERPL-MCNC: 2.27 MG/DL (ref 1.6–2.4)
MCH RBC QN AUTO: 26.7 PG (ref 26–34)
MCH RBC QN AUTO: 26.9 PG (ref 26–34)
MCHC RBC AUTO-ENTMCNC: 32.7 G/DL (ref 32–36)
MCHC RBC AUTO-ENTMCNC: 32.9 G/DL (ref 32–36)
MCV RBC AUTO: 82 FL (ref 80–100)
MCV RBC AUTO: 82 FL (ref 80–100)
MONOCYTES # BLD AUTO: 0.52 X10*3/UL (ref 0.1–1)
MONOCYTES # BLD AUTO: 0.54 X10*3/UL (ref 0.1–1)
MONOCYTES NFR BLD AUTO: 6.5 %
MONOCYTES NFR BLD AUTO: 6.5 %
NEUTROPHILS # BLD AUTO: 5.83 X10*3/UL (ref 1.2–7.7)
NEUTROPHILS # BLD AUTO: 6.18 X10*3/UL (ref 1.2–7.7)
NEUTROPHILS NFR BLD AUTO: 72.4 %
NEUTROPHILS NFR BLD AUTO: 73.9 %
NRBC BLD-RTO: 0 /100 WBCS (ref 0–0)
NRBC BLD-RTO: 0 /100 WBCS (ref 0–0)
PLATELET # BLD AUTO: 362 X10*3/UL (ref 150–450)
PLATELET # BLD AUTO: 406 X10*3/UL (ref 150–450)
POTASSIUM SERPL-SCNC: 4.2 MMOL/L (ref 3.5–5.3)
POTASSIUM SERPL-SCNC: 4.3 MMOL/L (ref 3.5–5.3)
PREGNANCY TEST URINE, POC: NEGATIVE
PROT SERPL-MCNC: 7.8 G/DL (ref 6.4–8.2)
RBC # BLD AUTO: 4.9 X10*6/UL (ref 4–5.2)
RBC # BLD AUTO: 5.31 X10*6/UL (ref 4–5.2)
SODIUM SERPL-SCNC: 138 MMOL/L (ref 136–145)
SODIUM SERPL-SCNC: 140 MMOL/L (ref 136–145)
TSH SERPL-ACNC: 0.96 MIU/L (ref 0.44–3.98)
WBC # BLD AUTO: 8.1 X10*3/UL (ref 4.4–11.3)
WBC # BLD AUTO: 8.4 X10*3/UL (ref 4.4–11.3)

## 2025-04-01 PROCEDURE — 36415 COLL VENOUS BLD VENIPUNCTURE: CPT | Performed by: PHYSICIAN ASSISTANT

## 2025-04-01 PROCEDURE — 85025 COMPLETE CBC W/AUTO DIFF WBC: CPT | Performed by: PHYSICIAN ASSISTANT

## 2025-04-01 PROCEDURE — 99223 1ST HOSP IP/OBS HIGH 75: CPT

## 2025-04-01 PROCEDURE — 85025 COMPLETE CBC W/AUTO DIFF WBC: CPT

## 2025-04-01 PROCEDURE — 2500000001 HC RX 250 WO HCPCS SELF ADMINISTERED DRUGS (ALT 637 FOR MEDICARE OP)

## 2025-04-01 PROCEDURE — 93010 ELECTROCARDIOGRAM REPORT: CPT | Performed by: EMERGENCY MEDICINE

## 2025-04-01 PROCEDURE — 80048 BASIC METABOLIC PNL TOTAL CA: CPT | Mod: CCI

## 2025-04-01 PROCEDURE — 99285 EMERGENCY DEPT VISIT HI MDM: CPT | Performed by: PHYSICIAN ASSISTANT

## 2025-04-01 PROCEDURE — 36415 COLL VENOUS BLD VENIPUNCTURE: CPT

## 2025-04-01 PROCEDURE — 99285 EMERGENCY DEPT VISIT HI MDM: CPT | Mod: 25 | Performed by: EMERGENCY MEDICINE

## 2025-04-01 PROCEDURE — 81025 URINE PREGNANCY TEST: CPT | Performed by: PHYSICIAN ASSISTANT

## 2025-04-01 PROCEDURE — 96361 HYDRATE IV INFUSION ADD-ON: CPT

## 2025-04-01 PROCEDURE — 80053 COMPREHEN METABOLIC PANEL: CPT | Performed by: PHYSICIAN ASSISTANT

## 2025-04-01 PROCEDURE — 2500000004 HC RX 250 GENERAL PHARMACY W/ HCPCS (ALT 636 FOR OP/ED): Performed by: PHYSICIAN ASSISTANT

## 2025-04-01 PROCEDURE — 1200000002 HC GENERAL ROOM WITH TELEMETRY DAILY

## 2025-04-01 PROCEDURE — 84443 ASSAY THYROID STIM HORMONE: CPT | Performed by: PHYSICIAN ASSISTANT

## 2025-04-01 PROCEDURE — 96360 HYDRATION IV INFUSION INIT: CPT

## 2025-04-01 PROCEDURE — 83735 ASSAY OF MAGNESIUM: CPT | Performed by: PHYSICIAN ASSISTANT

## 2025-04-01 RX ORDER — ACETAMINOPHEN 325 MG/1
650 TABLET ORAL EVERY 4 HOURS PRN
Status: DISCONTINUED | OUTPATIENT
Start: 2025-04-01 | End: 2025-04-03 | Stop reason: HOSPADM

## 2025-04-01 RX ORDER — POLYETHYLENE GLYCOL 3350 17 G/17G
17 POWDER, FOR SOLUTION ORAL DAILY
Status: DISCONTINUED | OUTPATIENT
Start: 2025-04-01 | End: 2025-04-03

## 2025-04-01 RX ORDER — POLYETHYLENE GLYCOL 3350 17 G/17G
17 POWDER, FOR SOLUTION ORAL DAILY
Status: CANCELLED | OUTPATIENT
Start: 2025-04-01

## 2025-04-01 RX ORDER — ACETAMINOPHEN 160 MG/5ML
650 SOLUTION ORAL EVERY 4 HOURS PRN
Status: CANCELLED | OUTPATIENT
Start: 2025-04-01

## 2025-04-01 RX ORDER — ACETAMINOPHEN 160 MG/5ML
650 SOLUTION ORAL EVERY 4 HOURS PRN
Status: DISCONTINUED | OUTPATIENT
Start: 2025-04-01 | End: 2025-04-03 | Stop reason: HOSPADM

## 2025-04-01 RX ORDER — METOPROLOL SUCCINATE 25 MG/1
25 TABLET, EXTENDED RELEASE ORAL DAILY
Status: DISCONTINUED | OUTPATIENT
Start: 2025-04-02 | End: 2025-04-03 | Stop reason: HOSPADM

## 2025-04-01 RX ORDER — ACETAMINOPHEN 325 MG/1
650 TABLET ORAL EVERY 4 HOURS PRN
Status: CANCELLED | OUTPATIENT
Start: 2025-04-01

## 2025-04-01 RX ADMIN — ACETAMINOPHEN 650 MG: 325 TABLET, FILM COATED ORAL at 20:54

## 2025-04-01 RX ADMIN — SODIUM CHLORIDE, SODIUM LACTATE, POTASSIUM CHLORIDE, AND CALCIUM CHLORIDE 1000 ML: 600; 310; 30; 20 INJECTION, SOLUTION INTRAVENOUS at 12:51

## 2025-04-01 ASSESSMENT — PAIN DESCRIPTION - ONSET: ONSET: GRADUAL

## 2025-04-01 ASSESSMENT — PAIN DESCRIPTION - LOCATION
LOCATION: FACE
LOCATION_2: EAR

## 2025-04-01 ASSESSMENT — PAIN DESCRIPTION - DESCRIPTORS
DESCRIPTORS_2: DISCOMFORT
DESCRIPTORS: PRESSURE
DESCRIPTORS: PRESSURE

## 2025-04-01 ASSESSMENT — COLUMBIA-SUICIDE SEVERITY RATING SCALE - C-SSRS
6. HAVE YOU EVER DONE ANYTHING, STARTED TO DO ANYTHING, OR PREPARED TO DO ANYTHING TO END YOUR LIFE?: NO
2. HAVE YOU ACTUALLY HAD ANY THOUGHTS OF KILLING YOURSELF?: NO
1. IN THE PAST MONTH, HAVE YOU WISHED YOU WERE DEAD OR WISHED YOU COULD GO TO SLEEP AND NOT WAKE UP?: NO

## 2025-04-01 ASSESSMENT — PAIN SCALES - GENERAL
PAINLEVEL_OUTOF10: 6
PAINLEVEL_OUTOF10: 7
PAINLEVEL_OUTOF10: 7

## 2025-04-01 ASSESSMENT — PAIN DESCRIPTION - PROGRESSION: CLINICAL_PROGRESSION: NOT CHANGED

## 2025-04-01 ASSESSMENT — VISUAL ACUITY
OD: 20/30
OU: 20/25
OS: 20/30
OD: 20
OS: 25

## 2025-04-01 ASSESSMENT — PAIN - FUNCTIONAL ASSESSMENT: PAIN_FUNCTIONAL_ASSESSMENT: 0-10

## 2025-04-01 ASSESSMENT — PAIN DESCRIPTION - FREQUENCY: FREQUENCY: CONSTANT/CONTINUOUS

## 2025-04-01 ASSESSMENT — PAIN SCALES - WONG BAKER: WONGBAKER_NUMERICALRESPONSE: HURTS LITTLE MORE

## 2025-04-01 NOTE — ED PROVIDER NOTES
History of Present Illness     History provided by: Patient  Limitations to History: None  External Records Reviewed with Brief Summary: Discharge Summary from 2024 which showed admission for delivery    HPI:  Ayesha Mata is a 34 y.o. female with a past medical history of migraines, CPVT status post AICD who is presenting today with double vision.  Patient reports that she has had a headache for about a month.  She reports that she was seen here at the beginning of the month.  She reports that since then she had a CT scan at that time that was negative.  She reports that since then she has been having progressively double worse double vision.  She reports that she has to keep the left eye closed for improvement in her symptoms. she also reports that she was seen by an optometrist where they were not able to visualize the optic nerve. They referred her to here for neurology and optho evaluation.     Physical Exam   Triage vitals:  T 35.7 °C (96.3 °F)  HR (!) 120  BP (!) 137/94  RR 18  O2 99 % None (Room air)    General: Awake, alert, in no acute distress  Eyes: Gaze conjugate.  No scleral icterus or injection. Extraoccular movement intact.  Vision intact in all 4 fields. No nystagmus. No swelling or erythema around the orbit. Left 20/30 and right 20/30 with corrective lens  HENT: Normo-cephalic, atraumatic. No stridor  CV: Tachycardic rate, regular rhythm. Radial pulses 2+ bilaterally  Resp: Breathing non-labored, speaking in full sentences.  Clear to auscultation bilaterally  GI: Soft, non-distended, non-tender. No  MSK/Extremities: No gross bony deformities. Moving all extremities  Skin: Warm. Appropriate color  Neuro: Alert. Oriented. Face symmetric. Speech is fluent.  Gross strength and sensation intact in b/l UE and LEs  Psych: Appropriate mood and affect      Medical Decision Making & ED Course   Medical Decision Makin y.o. female  with a past medical history of migraines, CPVT status post AICD  who is presenting today with double vision. Vitals notable for tachycardia.  Visual acuity was reassuring. IOP was deferred given need for optho evaluation. Patient was discussed with optho and neurology. Neurology accepted the patient for admission. Please see ed course for interpretation of the labs.   ----      Differential diagnoses considered include but are not limited to: Please see MDM.     Social Determinants of Health which Significantly Impact Care: None identified     EKG Independent Interpretation: If an EKG interpretation is available. Please see ED Course and [facility] for full interpretation.    Independent Result Review and Interpretation: Results were independently reviewed and interpreted by myself. Please see ED course and [facility] for full interpretation.    Chronic conditions affecting the patient's care: As documented in the MDM    The patient was discussed with the following consultants/services:  optho and neurology regarding evaluation and further management     Care Considerations: As per [facility]    ED Course:  ED Course as of 04/02/25 1638   Tue Apr 01, 2025   1652 Patient was discussed with neurology who accepted to their service for admission. [MÓNICA]   1655 Patient's lab work was reviewed overall unremarkable.  TSH was within normal limits.  Patient will likely need MRIs as an inpatient.  Discussed with neurology said they will order them. [MÓNICA]   2324 EKG was interpreted showed sinus tachycardia at a rate of 102.  No ST segment elevation.  Intervals within normal limits.  Normal axis. [MÓNICA]      ED Course User Index  [MÓNICA] Vivi Jacobs MD         Diagnoses as of 04/02/25 1638   Diplopia     Disposition   As a result of their workup, the patient will require admission to the hospital.  The patient was informed of her diagnosis.  The patient was given the opportunity to ask questions and I answered them. The patient agreed to be admitted to the hospital.    Procedures   Procedures    Patient seen and  discussed with ED attending physician.    Vivi Jacobs MD  Emergency Medicine     Vivi Jacobs MD  Resident  04/02/25 1640

## 2025-04-01 NOTE — ED TRIAGE NOTES
TRIAGE NOTE   I saw the patient as the Clinician in Triage and performed a brief history and physical exam, established acuity, and ordered appropriate tests to develop basic plan of care. Patient will be seen by an [facility], resident and/or physician who will independently evaluate the patient. Please see subsequent provider notes for further details and disposition.     Brief HPI: In brief, Ayesha Mata is a 34 y.o. female of CPVT on metoprolol as well as migraines that presents for headache for 1 month.  She describes the headache as a global pressure sensation that has been worsening.  She has been seen for this headache previously.  She states that over the past 10 days she has developed new double vision that resolves with closing 1 eye.  She endorses generalized malaise associated with this but denies any unilateral weakness, areas of decrease sensation or paresthesias.  She gave birth approximately 11 months ago.  She is not on any type of hormonal birth control at this time.  Denies any history of clots.  She states this is not typical of her migraines.  She went to an optometrist yesterday and they were unable to see her optic nerves, however they did not do a dilated eye exam.  She called her neurologist and was advised to come to the ED for neuro/Opto evaluation.  She has had a CT scan earlier this month for the headache which per report in epic was overall unremarkable      Focused Physical exam:   Grossly neuro intact without deficits.  No facial droop.  Clear speech.  Full strength and sensation to bilateral upper and lower extremities.  No ataxia.  Tachycardic.  Lungs clear bilaterally.      Plan/MDM:   Vitals reviewed which show patient was tachycardic at 120.  IV access obtained and patient ordered IV fluids.  As she has had recent CT imaging of her head for the same headache this was not repeated from triage.  Laboratory studies ordered.  Plan for possible neuro/ophthalmology consultation once  patient is roomed as well as imaging based on their recommendations.      Please see subsequent provider note for further details and disposition       I evaluated this patient in triage with the RN. Due to the patients complaint labs and or imaging were ordered by myself in an attempt to expedite patient care however I am not participating in care after evaluation. This is a preliminary assessment. Pt does not appear in acute distress at this time. They will have a full evaluation as soon as possible. They will be cared for by another provider who will possibly order more labs, imaging or interventions. Pt did not have a full ROS or PE completed by myself however below is a summary with reasons for orders.  For the remainder of the patient's workup and ED course, please refer to the main ED provider note. We discussed need for diagnostic testing including laboratory studies and imaging.  We also discussed that patient may be asked to wait in the waiting room while these tests are pending.  They understand that if they choose to leave without having the testing completed or resulted that we cannot rule out acute life threatening illnesses and the risks involved could lead to worsening condition, permanent disability or even death.

## 2025-04-01 NOTE — H&P
History Of Present Illness  Ayesha Mata is a 34 y.o. 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 Neurology consulted for further workup.      Patient reports new onset of headache starting one month ago. Her headache currently is described as a pressure sensation located bifrontally, periorbital and around her ears with a severity of 4-5/10. It is associated with phonophobia and a whooshing sensation in her ears. The whooshing and sensation of pressure are worse when she is lying down. Denies any photophobia, nausea/vomiting and auras. At the onset of the headache one month ago she describes initially right sided, pounding/aching headache that felt more severe compared to now.     She is also reporting new onset diplopia starting ~one week ago. It is improved when covering one eye. Also reports some degree of left eye blurry vision and intermittent pain with EOM.      Denies any autonomic features of headache. No new weakness, numbness, urinary or bladder incontinence. Does endorse longstanding fatigue and decreased appetite.      She had reportedly seen optometry yesterday but optic discs were not visualized a the patient was not dilated.      Of note was seen in the Emergency department at [facility] on 3/12/25 complaining of same headache. At this time endorse periods of generalized blurred vision. CTH was obtained at this time and unremarkable. She was given a migraine cocktail with improvement and discharged home with plans to follow up with Neurology outpatient.      She is currently 11 months post partum. She does not use OCB and denied smoking.     Past Medical History  Past Medical History:   Diagnosis Date    Anxiety disorder, unspecified     Anxiety and depression    Contact with and (suspected) exposure to lead 07/21/2022    History of lead exposure    Depression     Other ventricular  tachycardia (Multi)     CPVT (catecholaminergic polymorphic ventricular tachycardia)    Personal history of diseases of the blood and blood-forming organs and certain disorders involving the immune mechanism 07/21/2022    History of iron deficiency anemia     Surgical History  Past Surgical History:   Procedure Laterality Date    OTHER SURGICAL HISTORY  07/21/2022    Catheter ablation    OTHER SURGICAL HISTORY  07/21/2022    Cardioverter defibrillator insertion     Social History  Social History     Tobacco Use    Smoking status: Never     Passive exposure: Never    Smokeless tobacco: Never   Substance Use Topics    Alcohol use: Yes     Comment: social    Drug use: Never     Allergies  Penicillins, Adhesive tape-silicones, and Latex  (Not in a hospital admission)      Review of Systems  Neurological Exam  Mental Status  Awake, alert and oriented to person, place and time. Speech is normal. Language is fluent with no aphasia. Attention and concentration are normal. Fund of knowledge is appropriate for level of education.    Cranial Nerves  CN II: Tested with correction. Vision test: Mildly diminished visual acuity of the left eye (20/25). Right eye 20/20.  No red desaturation. Right visual acuity: 20/20. 25. Visual fields full to confrontation. Bilateral optic disc edema, left > right. Margins of disc not well visualized on the left eye  .  CN III, IV, VI: No nystagmus. Subtle decreased velocity of saccades when looking to left.. Normal smooth pursuit. Diplopia at its worst when looking straight. Seeing objects side to side. Left gaze diplopia worse than right.  Relative afferent pupillary defect absent.  CN V: Facial sensation is normal.  CN VII: Full and symmetric facial movement.  CN VIII: Hearing is normal.  CN IX, X: Palate elevates symmetrically  CN XI: Shoulder shrug strength is normal.  CN XII: Tongue midline without atrophy or fasciculations.    Motor  Normal muscle bulk throughout. No fasciculations  "present. Normal muscle tone. No abnormal involuntary movements.                                               Right                     Left   Shoulder abduction               5                          5  Elbow flexion                         5                          5  Elbow extension                    5                          5  Wrist flexion                           5                          5  Wrist extension                      5                          5  Hip flexion                              5                          5  Knee flexion                           5                          5  Knee extension                      5                          5  Plantarflexion                         5                          5  Dorsiflexion                            5                          5    Sensory  Light touch is normal in upper and lower extremities. Pinprick is normal in upper and lower extremities.     Reflexes  Deep tendon reflexes are 2+ and symmetric in all four extremities.    Coordination  Right: Finger-to-nose normal. Heel-to-shin normal.Left: Finger-to-nose normal. Heel-to-shin normal.    Gait  Casual gait is normal including stance, stride, and arm swing.    Physical Exam  Eyes:      Extraocular Movements: No nystagmus.      Comments: Mildly diminished visual acuity of the left eye (20/25). Right eye 20/20.  No red desaturation   Neurological:      Deep Tendon Reflexes: Reflexes are normal and symmetric.   Psychiatric:         Speech: Speech normal.       Last Recorded Vitals  Blood pressure 128/90, pulse 94, temperature 35.7 °C (96.3 °F), temperature source Temporal, resp. rate 16, height 1.727 m (5' 8\"), weight 65.8 kg (145 lb), last menstrual period 03/25/2025, SpO2 98%.    Relevant Results                    Alicia Coma Scale  Best Eye Response: Spontaneous  Best Verbal Response: Oriented  Best Motor Response: Follows commands  Alicia Coma Scale Score: 15                I have " personally reviewed the following imaging results:  Imaging  No results found.    Cardiology, Vascular, and Other Imaging  No other imaging results found for the past 7 days     CTH 3/12/25:  IMPRESSION:  No evidence of acute cortical infarct or intracranial hemorrhage.      No evidence of intracranial hemorrhage or displaced skull fracture.     Assessment/Plan   Assessment & Plan      Ayesha Mata is a 34 y.o. 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 Neurology consulted for further workup.      Patient describes bifrontal pressurized headache with associated pulsatile tinnitus that is worse lying flat. Additionally she has new onset diplopia. No RAPD, EOM grossly intact. Fundoscopic examination reveals bilateral R>L papilledema, pending full ophthalmology evaluation.  CTH obtained on 3/12 reviewed and grossly unrevealing but with possible subtle partially empty sella.      Overall impression, etiology of symptoms likely represents idiopathic intracranial hypertension. Will admit to neurology for further workup including MRI brain with & without contrast as well as lumbar puncture following MRI. Given her history of ICD implantation, will need to further investigate the compatibility prior to MRI. If not compatible, will proceed with lumber puncture and consider treatment with Acetazolamide (if safe for breast feeding).     Plan:  #bilateral papilledema  #headaches  #c/f IIH  - MRI brain and orbits with & without contrast  - coags, cbc & rfp in AM  - appreciate ophthalmology recs  [ ] Investigate compatibility of ICD with MRI, per MRI techs will need cardiology fellow engaged   [ ] Consider initiation of Acetazolamide for management (evaluate for safety in breast feeding)  [ ] Plan for spinal tap after MRI    #Hx of Catecholaminergic polymorphic ventricular tachycardia   :: S/p ICD placement and  ablation  - Continue home Metoprolol 25mg daily    F: prn  E: prn  N: regular  A: PIV    DVT ppx: Hold for LP         Alexis Kemp DO  Neurology, PGY-2

## 2025-04-01 NOTE — CONSULTS
Reason for consult: Diplopia    History Of Present Illness  Ayesha Mata is a 34 y.o. female with hx of CPVT (catechoaminergic polymorphic ventricular tachycardia) with ICD, iron deficiency anemia, migraine, anxiety, depression, regular astigmatism who is presenting for evaluation of double vision.   She reports 1 month ago she had a right sided headache that lasted for 1 week in duration. She was seen at an outside emergency room and was diagnosed with migraine headache, which improved after migraine medication in the ED. 1.5 weeks ago she started to develop binocular diplopia. She describes improvement with monocular occlusion. The diplopia is horizontal offset worse in left gaze and straight ahead, improved in right gaze. Along with the binocular diplopia, she has endorsed frontal head pressure, ear pressure, and pulsatile tinnitus for 1.5 weeks. She denies transient visual obscurations.      Past Medical History  She has a past medical history of Anxiety disorder, unspecified, Contact with and (suspected) exposure to lead (07/21/2022), Depression, Other ventricular tachycardia (Multi), and Personal history of diseases of the blood and blood-forming organs and certain disorders involving the immune mechanism (07/21/2022).    Family History: reviewed and not pertinent to chief complaint  Medications: please refer to medication reconciliation  Allergies: please refer to patient allergy list    Physical Exam  Base Eye Exam       Visual Acuity (Snellen - Linear)         Right Left    Near cc 20/20 20/20              Tonometry (Tonopen, 4:22 PM)         Right Left    Pressure 16 15              Pupils         Dark Light Shape React APD    Right 5 3 Round Brisk None    Left 5 3 Round Brisk None              Visual Fields (Counting fingers)         Left Right     Full Full              Extraocular Movement         Right Left     0 0 0   0  0   0 0 0    0 0 -2   0  -2   0 0 -2      No ANTONY             Dilation        Both eyes: 1% Mydriacyl & 2.5% Keny  @ 4:23 PM                  Additional Tests       Color         Right Left    Magui 11/11 11/11                  Strabismus Exam       Method: ED with distance target      Distance Near Near +3DS N Bifocals                      0 0 0  ET 25 0 0 -2                      ET 9 0  0  ET 20 0  -2  ET 25                     0 0 0  ET 18 0 0 -2                       Slit Lamp and Fundus Exam       Slit Lamp Exam         Right Left    Lids/Lashes Normal Normal    Conjunctiva/Sclera White and quiet White and quiet    Cornea Clear Clear    Anterior Chamber Deep and quiet Deep and quiet    Iris Round and reactive Round and reactive    Lens Clear Clear    Anterior Vitreous Normal Normal              Fundus Exam         Right Left    Posterior Vitreous Normal Normal    Disc Grade 2 edema, inferior flame hemorrhage Grade 2 edema    C/D Ratio 0.2 0.2    Macula Normal Normal    Vessels Normal Normal    Periphery Normal Normal                    Imaging  CT Head from 3/12/2025 reviewed without any abnormalities noted.     Assessment:  34 y.o. 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     Her exam is notable for excellent visual acuity (VA), no rAPD, but with left eye abduction deficit and esotropia, worse in left gaze and improved in right gaze. Her dilated fundus exam is notable for grade 2 bilateral optic disc edema. The findings are most suspicious for idiopathic intracranial hypertension. Alternatives include venous sinus thrombosis & intracranial mass.     Recommendations:  - Obtain MRI Brain and orbits with contrast and MRV head. If unable to get MRI due to defibrillator, can obtain CT head and orbits with contrast, CT venogram head with contrast.  - If not contraindicated, please obtain lumbar  puncture in lateral decubitus position with opening pressure, protein, glucose & cell count. If the opening pressure is above 20 cm water, the diagnosis will be secured, and the patient should begin treatment with acetazolamide if not otherwise contraindicated.      Ophthalmology to follow for results of the above testing.    Discussed with attending physician Dr. Alejandro    Thank you for the consult. Note not final until attending signature. Please contact the Ophthalmology service with further questions or concerns.    Zak Bashir MD  Department of Ophthalmology, PGY-3    Ophthalmology Adult Pager - [phone]  Ophthalmology Pediatrics Pager - [phone]     For adult follow-up appointments, call: [phone]  For pediatric follow-up appointments, call: [phone]

## 2025-04-01 NOTE — ED TRIAGE NOTES
Pt states she started having migraines and had a CT scan done. Following the CT scan she developed the whooshing in her ears, double vision, saw optho yesterday and was told they can't see the nerve in both of her eyes. Pt mentioned she does have a defibrillator.

## 2025-04-02 ENCOUNTER — APPOINTMENT (OUTPATIENT)
Dept: RADIOLOGY | Facility: HOSPITAL | Age: 35
End: 2025-04-02
Payer: COMMERCIAL

## 2025-04-02 ENCOUNTER — HOSPITAL ENCOUNTER (EMERGENCY)
Facility: HOSPITAL | Age: 35
Discharge: STILL ADMITTED OR ADMINISTRATIVE | End: 2025-04-02
Payer: COMMERCIAL

## 2025-04-02 ENCOUNTER — APPOINTMENT (OUTPATIENT)
Dept: CARDIOLOGY | Facility: HOSPITAL | Age: 35
End: 2025-04-02
Payer: COMMERCIAL

## 2025-04-02 LAB
ALBUMIN SERPL BCP-MCNC: 4.4 G/DL (ref 3.4–5)
ANION GAP SERPL CALC-SCNC: 17 MMOL/L (ref 10–20)
APPEARANCE CSF: CLEAR
APPEARANCE CSF: CLEAR
BASOPHILS NFR CSF MANUAL: 0 %
BASOPHILS NFR CSF MANUAL: 0 %
BLASTS CSF MANUAL: 0 %
BLASTS CSF MANUAL: 0 %
BUN SERPL-MCNC: 8 MG/DL (ref 6–23)
C GATTII+NEOFOR DNA CSF QL NAA+NON-PROBE: NOT DETECTED
CALCIUM SERPL-MCNC: 9.4 MG/DL (ref 8.6–10.6)
CHLORIDE SERPL-SCNC: 104 MMOL/L (ref 98–107)
CMV DNA CSF QL NAA+NON-PROBE: NOT DETECTED
CO2 SERPL-SCNC: 22 MMOL/L (ref 21–32)
COLOR CSF: COLORLESS
COLOR CSF: COLORLESS
COLOR SPUN CSF: COLORLESS
COLOR SPUN CSF: COLORLESS
CREAT SERPL-MCNC: 0.66 MG/DL (ref 0.5–1.05)
E COLI K1 DNA CSF QL NAA+NON-PROBE: NOT DETECTED
EGFRCR SERPLBLD CKD-EPI 2021: >90 ML/MIN/1.73M*2
EOSINOPHIL NFR CSF MANUAL: 0 %
EOSINOPHIL NFR CSF MANUAL: 0 %
EV RNA CSF QL NAA+NON-PROBE: NOT DETECTED
GLUCOSE BLD MANUAL STRIP-MCNC: 103 MG/DL (ref 74–99)
GLUCOSE BLD MANUAL STRIP-MCNC: 98 MG/DL (ref 74–99)
GLUCOSE CSF-MCNC: 48 MG/DL (ref 40–70)
GLUCOSE SERPL-MCNC: 63 MG/DL (ref 74–99)
GP B STREP DNA CSF QL NAA+NON-PROBE: NOT DETECTED
HAEM INFLU DNA CSF QL NAA+NON-PROBE: NOT DETECTED
HHV6 DNA CSF QL NAA+NON-PROBE: NOT DETECTED
HOLD SPECIMEN: NORMAL
HSV1 DNA CSF QL NAA+NON-PROBE: NOT DETECTED
HSV2 DNA CSF QL NAA+NON-PROBE: NOT DETECTED
IMM GRANULOCYTES NFR CSF: 0 %
IMM GRANULOCYTES NFR CSF: 0 %
L MONOCYTOG DNA CSF QL NAA+NON-PROBE: NOT DETECTED
LYMPHOCYTES NFR CSF MANUAL: 95 % (ref 28–96)
LYMPHOCYTES NFR CSF MANUAL: 97 % (ref 28–96)
MONOS+MACROS NFR CSF MANUAL: 2 % (ref 16–56)
MONOS+MACROS NFR CSF MANUAL: 4 % (ref 16–56)
N MEN DNA CSF QL NAA+NON-PROBE: NOT DETECTED
NEUTS SEG NFR CSF MANUAL: 0 % (ref 0–5)
NEUTS SEG NFR CSF MANUAL: 0 % (ref 0–5)
OTHER CELLS NFR CSF MANUAL: 1 %
OTHER CELLS NFR CSF MANUAL: 1 %
PARECHOVIRUS A RNA CSF QL NAA+NON-PROBE: NOT DETECTED
PHOSPHATE SERPL-MCNC: 4.1 MG/DL (ref 2.5–4.9)
PLASMA CELLS NFR CSF MICRO: 0 %
PLASMA CELLS NFR CSF MICRO: 0 %
POTASSIUM SERPL-SCNC: 4.1 MMOL/L (ref 3.5–5.3)
PROT CSF-MCNC: 39 MG/DL (ref 15–45)
RBC # CSF AUTO: 0 /UL (ref 0–5)
RBC # CSF AUTO: 3 /UL (ref 0–5)
S PNEUM DNA CSF QL NAA+NON-PROBE: NOT DETECTED
SODIUM SERPL-SCNC: 139 MMOL/L (ref 136–145)
TOTAL CELLS COUNTED CSF: 100
TOTAL CELLS COUNTED CSF: 100
TUBE # CSF: ABNORMAL
TUBE # CSF: ABNORMAL
VZV DNA CSF QL NAA+NON-PROBE: NOT DETECTED
WBC # CSF AUTO: 13 /UL (ref 1–5)
WBC # CSF AUTO: 15 /UL (ref 1–5)

## 2025-04-02 PROCEDURE — 89051 BODY FLUID CELL COUNT: CPT

## 2025-04-02 PROCEDURE — 70496 CT ANGIOGRAPHY HEAD: CPT

## 2025-04-02 PROCEDURE — 62270 DX LMBR SPI PNXR: CPT

## 2025-04-02 PROCEDURE — 36415 COLL VENOUS BLD VENIPUNCTURE: CPT

## 2025-04-02 PROCEDURE — 93282 PRGRMG EVAL IMPLANTABLE DFB: CPT

## 2025-04-02 PROCEDURE — 86053 AQAPRN-4 ANTB FLO CYTMTRY EA: CPT

## 2025-04-02 PROCEDURE — 70496 CT ANGIOGRAPHY HEAD: CPT | Performed by: RADIOLOGY

## 2025-04-02 PROCEDURE — 2500000001 HC RX 250 WO HCPCS SELF ADMINISTERED DRUGS (ALT 637 FOR MEDICARE OP)

## 2025-04-02 PROCEDURE — 86363 MOG-IGG1 ANTB FLO CYTMTRY EA: CPT

## 2025-04-02 PROCEDURE — 82784 ASSAY IGA/IGD/IGG/IGM EACH: CPT

## 2025-04-02 PROCEDURE — 88185 FLOWCYTOMETRY/TC ADD-ON: CPT | Mod: TC

## 2025-04-02 PROCEDURE — 87483 CNS DNA AMP PROBE TYPE 12-25: CPT

## 2025-04-02 PROCEDURE — 89050 BODY FLUID CELL COUNT: CPT

## 2025-04-02 PROCEDURE — 82040 ASSAY OF SERUM ALBUMIN: CPT

## 2025-04-02 PROCEDURE — 82945 GLUCOSE OTHER FLUID: CPT

## 2025-04-02 PROCEDURE — 84157 ASSAY OF PROTEIN OTHER: CPT

## 2025-04-02 PROCEDURE — 99233 SBSQ HOSP IP/OBS HIGH 50: CPT

## 2025-04-02 PROCEDURE — 2550000001 HC RX 255 CONTRASTS: Performed by: STUDENT IN AN ORGANIZED HEALTH CARE EDUCATION/TRAINING PROGRAM

## 2025-04-02 PROCEDURE — 82042 OTHER SOURCE ALBUMIN QUAN EA: CPT

## 2025-04-02 PROCEDURE — 009U3ZX DRAINAGE OF SPINAL CANAL, PERCUTANEOUS APPROACH, DIAGNOSTIC: ICD-10-PCS | Performed by: STUDENT IN AN ORGANIZED HEALTH CARE EDUCATION/TRAINING PROGRAM

## 2025-04-02 PROCEDURE — 93282 PRGRMG EVAL IMPLANTABLE DFB: CPT | Performed by: INTERNAL MEDICINE

## 2025-04-02 PROCEDURE — 82947 ASSAY GLUCOSE BLOOD QUANT: CPT

## 2025-04-02 PROCEDURE — 4500999001 HC ED NO CHARGE

## 2025-04-02 PROCEDURE — 1200000002 HC GENERAL ROOM WITH TELEMETRY DAILY

## 2025-04-02 RX ORDER — ACETAZOLAMIDE 250 MG/1
250 TABLET ORAL 2 TIMES DAILY
Status: DISCONTINUED | OUTPATIENT
Start: 2025-04-02 | End: 2025-04-03 | Stop reason: HOSPADM

## 2025-04-02 RX ORDER — ONDANSETRON 4 MG/1
4 TABLET, ORALLY DISINTEGRATING ORAL EVERY 8 HOURS PRN
COMMUNITY
End: 2025-04-03 | Stop reason: HOSPADM

## 2025-04-02 RX ORDER — METOPROLOL SUCCINATE 25 MG/1
25 TABLET, EXTENDED RELEASE ORAL
COMMUNITY
Start: 2025-03-06 | End: 2026-03-06

## 2025-04-02 RX ADMIN — ACETAMINOPHEN 650 MG: 325 TABLET, FILM COATED ORAL at 05:13

## 2025-04-02 RX ADMIN — ACETAZOLAMIDE 250 MG: 250 TABLET ORAL at 15:25

## 2025-04-02 RX ADMIN — ACETAZOLAMIDE 250 MG: 250 TABLET ORAL at 22:17

## 2025-04-02 RX ADMIN — IOHEXOL 90 ML: 350 INJECTION, SOLUTION INTRAVENOUS at 01:32

## 2025-04-02 RX ADMIN — METOPROLOL SUCCINATE 25 MG: 25 TABLET, EXTENDED RELEASE ORAL at 09:41

## 2025-04-02 RX ADMIN — ACETAMINOPHEN 650 MG: 325 TABLET, FILM COATED ORAL at 18:33

## 2025-04-02 ASSESSMENT — PAIN SCALES - GENERAL
PAINLEVEL_OUTOF10: 5 - MODERATE PAIN
PAINLEVEL_OUTOF10: 3
PAINLEVEL_OUTOF10: 5 - MODERATE PAIN
PAINLEVEL_OUTOF10: 0 - NO PAIN
PAINLEVEL_OUTOF10: 6

## 2025-04-02 ASSESSMENT — PAIN - FUNCTIONAL ASSESSMENT
PAIN_FUNCTIONAL_ASSESSMENT: 0-10

## 2025-04-02 ASSESSMENT — COGNITIVE AND FUNCTIONAL STATUS - GENERAL
DAILY ACTIVITIY SCORE: 24
MOBILITY SCORE: 24

## 2025-04-02 ASSESSMENT — PAIN DESCRIPTION - LOCATION: LOCATION: HEAD

## 2025-04-02 NOTE — PROGRESS NOTES
"Ayesha Mata is a 34 y.o. female on day 1 of admission presenting with Diplopia.      Subjective   No acute events overnight. Pt reported headache and double vision remained the same.     Objective     Last Recorded Vitals  Blood pressure 100/65, pulse 98, temperature 36.1 °C (97 °F), temperature source Temporal, resp. rate 15, height 1.727 m (5' 8\"), weight 65.8 kg (145 lb), last menstrual period 03/25/2025, SpO2 97%.    Physical Exam  Neurological Exam    General Appearance:  No distress, alert, interactive and cooperative.     Mental State: Orientation was normal to time, place and person.     Cranial Nerves:   CN 2: Visual fields full to confrontation. No RAPD. No red-desaturation.   CN 3, 4, 6: Pupils round, 4 mm in diameter, equally reactive to light. Lids symmetric; no ptosis. EOMs normal alignment, full range with normal saccades, pursuit and convergence. No nystagmus. Diplopia worse when looking straight and with left gaze.   CN 5: Facial sensation intact bilaterally. Symmetric masseter and temporal muscles tone bilaterally.   CN 7: Normal and symmetric facial strength. Nasolabial folds symmetric.   CN 8: Hearing intact to voice  CN 9: Palate elevates symmetrically.   CN 11: Normal strength of shoulder shrug and neck turning.   CN 12: Tongue midline, with normal bulk and strength; no fasciculations.     Motor: Muscle bulk and tone were normal in both upper and lower extremities. No fasciculations, tremor or other abnormal movements were present. Muscle strength was 5/5 in distal and proximal muscles in both upper and lower extremities.     Reflexes: Right/ Left:  Biceps 2/2, brachioradialis 2/2, triceps 2/2, patellar 2/2, ankle 2/2  Babinski: toes downgoing to plantar stimulation. No clonus, frontal release signs or other pathologic reflexes present.     Sensory: In both upper and lower extremities, sensation was intact to light touch    Coordination: In both upper extremities, finger-nose-finger was " intact without dysmetria or overshoot.     Gait: not tested     Relevant Results    CT venogram head w and wo iv contrast    Result Date: 4/2/2025  Interpreted By:  Abby Emmanuel,  Tran Marsh STUDY: CT VENOGRAM HEAD W AND WO IV CONTRAST AND POST PROCEDURE;  4/2/2025 2:03 am   INDICATION: Signs/Symptoms:c/f IIH, rule out CVST.   COMPARISON: CT head 03/12/2025.   ACCESSION NUMBER(S): [id number]   ORDERING CLINICIAN: SUKH SALCIDO   TECHNIQUE: Noncontrast axial CT imaging was performed through the brain. CT venogram imaging was performed through the brain following the administration of 90 mL Omnipaque 350 intravenous contrast. 3D reconstructions were rendered using separate 3D workstation.   FINDINGS: CT HEAD:   Parenchyma: There is no intracranial hemorrhage. The grey-white differentiation is intact. There is no mass effect or midline shift. No pathologic enhancing lesion seen on postcontrast imaging. CSF Spaces: The ventricles, sulci and basal cisterns are within normal limits for age. Extra-Axial Fluid: There is no extraaxial fluid collection. Calvarium: The calvarium is unremarkable. Paranasal sinuses: Visualized paranasal sinuses are clear. Mastoids: Clear. Orbits: Normal. Soft tissues: Unremarkable.   VENOUS STRUCTURES:   Superior sagittal sinus: Normal. Torcula: Normal. Straight sinus:Normal. Internal cerebral veins/vein of Pete: Normal. Transverse sinuses: Normal. Sigmoid sinuses: Normal. Proximal jugular veins: Normal.       1. No acute intracranial abnormality. 2. No evidence of venous sinus thrombosis.   I personally reviewed the images/study and resident's interpretation and I agree with the findings as stated by Salma Lewis MD (resident radiologist). This study was analyzed and interpreted at [place], [place].       Signed by: Abby Emmanuel 4/2/2025 2:30 AM Dictation workstation:   [id number]     Results for orders placed or performed during  the hospital encounter of 04/01/25 (from the past 24 hours)   POCT pregnancy, urine   Result Value Ref Range    Preg Test, Ur Negative    CBC and Auto Differential   Result Value Ref Range    WBC 8.4 4.4 - 11.3 x10*3/uL    nRBC 0.0 0.0 - 0.0 /100 WBCs    RBC 4.90 4.00 - 5.20 x10*6/uL    Hemoglobin 13.1 12.0 - 16.0 g/dL    Hematocrit 40.1 36.0 - 46.0 %    MCV 82 80 - 100 fL    MCH 26.7 26.0 - 34.0 pg    MCHC 32.7 32.0 - 36.0 g/dL    RDW 13.2 11.5 - 14.5 %    Platelets 362 150 - 450 x10*3/uL    Neutrophils % 73.9 40.0 - 80.0 %    Immature Granulocytes %, Automated 0.4 0.0 - 0.9 %    Lymphocytes % 17.9 13.0 - 44.0 %    Monocytes % 6.5 2.0 - 10.0 %    Eosinophils % 0.6 0.0 - 6.0 %    Basophils % 0.7 0.0 - 2.0 %    Neutrophils Absolute 6.18 1.20 - 7.70 x10*3/uL    Immature Granulocytes Absolute, Automated 0.03 0.00 - 0.70 x10*3/uL    Lymphocytes Absolute 1.50 1.20 - 4.80 x10*3/uL    Monocytes Absolute 0.54 0.10 - 1.00 x10*3/uL    Eosinophils Absolute 0.05 0.00 - 0.70 x10*3/uL    Basophils Absolute 0.06 0.00 - 0.10 x10*3/uL   Basic metabolic panel   Result Value Ref Range    Glucose 75 74 - 99 mg/dL    Sodium 140 136 - 145 mmol/L    Potassium 4.2 3.5 - 5.3 mmol/L    Chloride 104 98 - 107 mmol/L    Bicarbonate 24 21 - 32 mmol/L    Anion Gap 16 10 - 20 mmol/L    Urea Nitrogen 8 6 - 23 mg/dL    Creatinine 0.70 0.50 - 1.05 mg/dL    eGFR >90 >60 mL/min/1.73m*2    Calcium 10.0 8.6 - 10.6 mg/dL   Renal function panel   Result Value Ref Range    Glucose 63 (L) 74 - 99 mg/dL    Sodium 139 136 - 145 mmol/L    Potassium 4.1 3.5 - 5.3 mmol/L    Chloride 104 98 - 107 mmol/L    Bicarbonate 22 21 - 32 mmol/L    Anion Gap 17 10 - 20 mmol/L    Urea Nitrogen 8 6 - 23 mg/dL    Creatinine 0.66 0.50 - 1.05 mg/dL    eGFR >90 >60 mL/min/1.73m*2    Calcium 9.4 8.6 - 10.6 mg/dL    Phosphorus 4.1 2.5 - 4.9 mg/dL    Albumin 4.4 3.4 - 5.0 g/dL   POCT GLUCOSE   Result Value Ref Range    POCT Glucose 98 74 - 99 mg/dL   CSF Cell Count   Result Value  Ref Range    Tube Number, CSF Tube 1       Color, CSF Colorless Colorless    Clarity, CSF Clear Clear    Color, Supernatant CSF Colorless      WBC, CSF 15 (H) 1 - 5 /uL    RBC, CSF 3 0 - 5 /uL   CSF Differential   Result Value Ref Range    Neutrophils %, Manual, CSF 0 0 - 5 %    Lymphocytes %, Manual, CSF 97 (H) 28 - 96 %    Mono/Macrophages %, Manual, CSF 2 (L) 16 - 56 %    Eosinophils %, Manual, CSF 0 Rare %    Basophils %, Manual, CSF 0 Not Established %    Immature Granulocytes %, Manual, CSF 0 Not Established %    Blasts %, Manual, CSF 0 Not Established %    Unclassified Cells %, Manual, CSF 1 Not Established %    Plasma Cells %, Manual, CSF 0 Not Established %    Total Cells Counted,     White Top   Result Value Ref Range    Extra Tube Hold for add-ons.    CSF Cell Count   Result Value Ref Range    Tube Number, CSF Tube 4       Color, CSF Colorless Colorless    Clarity, CSF Clear Clear    Color, Supernatant CSF Colorless      WBC, CSF 13 (H) 1 - 5 /uL    RBC, CSF 0 0 - 5 /uL   CSF Differential   Result Value Ref Range    Neutrophils %, Manual, CSF 0 0 - 5 %    Lymphocytes %, Manual, CSF 95 28 - 96 %    Mono/Macrophages %, Manual, CSF 4 (L) 16 - 56 %    Eosinophils %, Manual, CSF 0 Rare %    Basophils %, Manual, CSF 0 Not Established %    Immature Granulocytes %, Manual, CSF 0 Not Established %    Blasts %, Manual, CSF 0 Not Established %    Unclassified Cells %, Manual, CSF 1 Not Established %    Plasma Cells %, Manual, CSF 0 Not Established %    Total Cells Counted,     Glucose, CSF   Result Value Ref Range    Glucose, CSF 48 40 - 70 mg/dL   Protein, CSF   Result Value Ref Range    Total Protein, CSF 39 15 - 45 mg/dL   POCT GLUCOSE   Result Value Ref Range    POCT Glucose 103 (H) 74 - 99 mg/dL         Assessment/Plan      Ayesha Mata is a 34 y.o. 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   Exam was notable for bilateral R>L papilledema and diplopia, no RAPD or red-desatruation, EOM grossly intact. CTH obtained on 3/12 reviewed and grossly unrevealing but with possible subtle partially empty sella. CTV was negative for venous sinus thrombosis. LP performed with opening pressure of 46cm H2O and closing pressure of 14cm H2O after removal of 14cc CSF. Normal CSF glucose (48) and protein (39), lymphocytic pleocytosis (13), RBC (0). Pending further work up.        Plan:  #bilateral papilledema  #headaches  #c/f IIH    :: LP opening pressure 46cm, closing pressurer 14cm, with removal of 14cc CSF  :: Normal CSF glucose (48) and protein (39), lymphocytic pleocytosis (13), RBC (0)    Plan:   - pending MRI brain and orbits with & without contrast, pending device clinic clearance   - neuro-ophthalmology recs adding cytology, flow cytometry, meningitis panel  - added serum NMO and MOG lab  - started diamox 250 mg BID        #Hx of Catecholaminergic polymorphic ventricular tachycardia   :: S/p ICD placement and ablation    - Continue home Metoprolol 25mg daily     F: prn  E: prn  N: regular  A: PIV  DVT ppx: none      Missael Hess MD  PGY-1 Neurology

## 2025-04-02 NOTE — PROCEDURES
NEUROLOGY PROCEDURE NOTE  Procedure Name: Lumbar Puncture  Date: 4/2/25  Time of Procedure: 0750  Proceduralist(s): Missael Hess  Assistant(s): Lexy Sheppard  Indication(s): IIH  Consented?: Yes  Pre-Procedure Verification?: Yes    I reviewed the patient's CBC, coagulation profile, and active meds prior to the procedure.    The patient was positioned in lateral decubitus. The lower back was prepped and draped in the usual sterile fashion. A solution of 1% lidocaine was used to numb the region. Using landmarks, a 22-gauge Quincke spinal needle was inserted in the L4-L5 innerspace and advanced into the subarachnoid space on the first attempt. The stylet was removed, and the opening pressure was measured at 46 cm of water. Four tubes containing a total approximate volume of 14 cc of clear fluid were collected and sent for analysis. Closing pressure was 14 cm of water. Patient's serum POCT glucose was 103 at the time of the LP. The patient tolerated the procedure well; there were no immediate complications. Patient was instructed to rest supine for 1 hour.

## 2025-04-02 NOTE — DISCHARGE INSTRUCTIONS
REASON FOR ADMISSION & BRIEF DESCRIPTION OF HOSPITAL STAY:   Dear Ms. Mata,  You presented to [facility] with a chief complaint of headaches and double vision. Ophthalmology was consulted, and exam was notable for grade 2 bilateral optic disc edema. Neurology was also consulted, exam was mostly unremarkable except double vision noted, worse when looking straight and left gaze. CT venogram of the head did not show any blood clots in the veins. Lumbar puncture was performed, and opening pressure was significantly elevated (46 cm of water), and closing pressure was 14cm of water. Because we also found elevated white blood cells in the CSF, we wanted to rule out other causes such as infection or inflammation. We also ordered a brain, orbit and cervical spine MRI to further investigate the etiology. Findings for your brain and spine were normal with no abnormalities.      HOW TO TAKE CARE OF YOURSELF AFTER DISCHARGE:  -Please follow-up with your doctors appointment and take medications as prescribed.     -please follow up with Neuro-ophthalmology, a referral has been made.     -Please folllow up with Neurology in Resident Clinic    The central scheduling line is [phone] if you do not hear from one of these services or if you need to reschedule.  If you have any worsening symptoms including shortness of breath, chest pain, nausea or vomiting, or extremely high blood sugars please do not hesitate to go to the emergency room.      Medication Changes:   Medications started: Diamox 250mg twice daily   Medications stopped: None      Appointments/Follow-up:  Future Appointments   Date Time Provider Department Center   5/12/2025 11:30 AM BARRERA Morales-CNP [id number] [place]          Please follow up with:  -Neuro-ophthalmology          Sincerely,  Your  Care Team

## 2025-04-02 NOTE — PROGRESS NOTES
Pharmacy Medication History Review    Ayesha Mata is a 34 y.o. female admitted for Diplopia. Pharmacy reviewed the patient's [id number] medications and allergies for accuracy.    The list below reflects the updated PTA list.   Prior to Admission Medications   Prescriptions Last Dose Informant Patient Reported? Taking?   Trintellix 10 mg tablet tablet Not Taking Self No No   Sig: TAKE 1 TABLET BY MOUTH EVERY DAY   Patient not taking: Reported on 4/2/2025   albuterol 90 mcg/actuation inhaler Not Taking  No No   Sig: Inhale 1-2 puffs every 6 hours if needed for wheezing.   Patient not taking: Reported on 4/2/2025   metoprolol succinate XL (Toprol-XL) 25 mg 24 hr tablet 4/1/2025 Morning Self Yes Yes   Sig: Take 1 tablet (25 mg) by mouth once daily.   ondansetron ODT (Zofran-ODT) 4 mg disintegrating tablet Unknown Self Yes Not started   Sig: Dissolve 1 tablet (4 mg) in the mouth every 8 hours if needed for nausea or vomiting.      Facility-Administered Medications: None        The list below reflects the updated allergy list. Please review each documented allergy for additional clarification and justification.  Allergies  Reviewed by Tomasa MARTINEZ RN on 4/1/2025        Severity Reactions Comments    Penicillins Not Specified Other Gatrointestinal upset    Adhesive Tape-silicones Low Rash Band aids    Latex Low Rash             Patient accepts M2B at discharge.     Sources:   Patient Interview - good historian, able to independently list out current medications and directions for administration  Admission MedRec Grid  OARRS - none   EPIC medication dispense report    Medications ADDED:  Metoprolol succinate 25mg  Ondansetron 4mg ODT  Medications CHANGED:  None  Medications REMOVED/MARKED NOT TAKING:   Albuterol inhaler  Hydroxyzine 25mg  Vortioxetine 10mg     Additional Comments:  None    Princess Avila, PharmD  Transitions of Care Pharmacist  04/02/25     Secure Chat preferred   If no response call  "[phone] or Vocera \"Med Rec\"    "

## 2025-04-02 NOTE — PROGRESS NOTES
"Ayesha Mata is a 34 y.o. female on day 1 of admission presenting with Diplopia.      Subjective   She had lumbar puncture this morning with elevated opening pressure, and feels her headache is much improved after the procedure. She still has persistent unchanged horizontal diplopia however. Vision remains good.        Objective     Last Recorded Vitals  Blood pressure 100/65, pulse 98, temperature 36.1 °C (97 °F), temperature source Temporal, resp. rate 15, height 1.727 m (5' 8\"), weight 65.8 kg (145 lb), last menstrual period 03/25/2025, SpO2 97%.    Physical Exam  Base Eye Exam       Visual Acuity (Snellen - Linear)         Right Left    Near cc 20/20 20/20              Tonometry (Tonopen, 12:19 PM)         Right Left    Pressure 12 13              Pupils         Dark Light Shape React APD    Right 5 3 Round Brisk None    Left 5 3 Round Brisk None              Visual Fields (Counting fingers)         Left Right     Full Full              Extraocular Movement         Right Left     0 0 0   0  0   0 0 0    0 0 -2   0  -2   0 0 -2      ET still worse in left gaze             Neuro/Psych       Oriented x3: Yes                  Additional Tests       Color         Right Left    Ishihara 11/11 11/11                          Assessment/Plan   Assessment & Plan  Diplopia    34 y.o. 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      Her initial exam is notable for excellent visual acuity (VA), no rAPD, but with left eye abduction deficit and esotropia, worse in left gaze and improved in right gaze. Her dilated fundus exam is notable for grade 2 bilateral optic disc edema. CT venogram did not show CVST. So far, we have been unable to obtain MRI due to her ICD. Lumbar puncture was notable for elevated opening pressure of 46 cm H2O " and WBC 15, with lymphocytic predominance. Given the lymphocytosis in the CSF, there is likely some underlying infectious or inflammatory etiology.    Update 4/02:  - CT Venogram performed without evidence of CVST  - LP with elevated OP to 46 cm H2O with WBC elevated 15, lymphocytic predominance  - Agree with neurology recommendation to pursue MRI Brain with contrast to evaluate for infectious or inflammatory etiologies. Will continue to follow up cerebrospinal fluid (CSF) studies as well. Recommend sending cerebrospinal fluid (CSF) for flow cytometry and cytology.      #Bilateral optic disc edema  #Left cranial nerve (CN) VI palsy  - CT Head 3/12/25 without lesion  - CT Venogram 4/01 without evidence of CVST  - Lumbar puncture with elevated OP of 46 cm H2O. Cerebrospinal fluid (CSF) studies notable for elevated WBC 15 with lymphocytic predominance  - Follow up cerebrospinal fluid (CSF) studies including cytology and flow cytometry. Appreciate neurology recommendations          Discussed with attending physician Dr. Alejandro     Thank you for the consult. Note not final until attending signature. Please contact the Ophthalmology service with further questions or concerns.     Zak Bashir MD  Department of Ophthalmology, PGY-3     Ophthalmology Adult Pager - [phone]  Ophthalmology Pediatrics Pager - [phone]     For adult follow-up appointments, call: [phone]  For pediatric follow-up appointments, call: [phone]

## 2025-04-02 NOTE — NURSING NOTE
Patient transferred from ED to [id number] via stretcher in stable condition. Patient oriented to room, bed, and call light. Skin assessment witnessed by Nandini Hart. Will continue to monitor.

## 2025-04-03 ENCOUNTER — APPOINTMENT (OUTPATIENT)
Dept: CARDIOLOGY | Facility: HOSPITAL | Age: 35
End: 2025-04-03
Payer: COMMERCIAL

## 2025-04-03 ENCOUNTER — APPOINTMENT (OUTPATIENT)
Dept: RADIOLOGY | Facility: HOSPITAL | Age: 35
End: 2025-04-03
Payer: COMMERCIAL

## 2025-04-03 VITALS
DIASTOLIC BLOOD PRESSURE: 73 MMHG | TEMPERATURE: 96.8 F | OXYGEN SATURATION: 97 % | BODY MASS INDEX: 21.98 KG/M2 | HEIGHT: 68 IN | WEIGHT: 145 LBS | HEART RATE: 93 BPM | SYSTOLIC BLOOD PRESSURE: 103 MMHG | RESPIRATION RATE: 20 BRPM

## 2025-04-03 PROBLEM — G93.2 IIH (IDIOPATHIC INTRACRANIAL HYPERTENSION): Status: ACTIVE | Noted: 2025-04-03

## 2025-04-03 LAB
PATH REVIEW-CELL CT,CSF: NORMAL
PATH REVIEW-CELL CT,CSF: NORMAL

## 2025-04-03 PROCEDURE — 93287 PERI-PX DEVICE EVAL & PRGR: CPT | Performed by: INTERNAL MEDICINE

## 2025-04-03 PROCEDURE — 72156 MRI NECK SPINE W/O & W/DYE: CPT

## 2025-04-03 PROCEDURE — 93287 PERI-PX DEVICE EVAL & PRGR: CPT

## 2025-04-03 PROCEDURE — 70553 MRI BRAIN STEM W/O & W/DYE: CPT | Performed by: RADIOLOGY

## 2025-04-03 PROCEDURE — 70553 MRI BRAIN STEM W/O & W/DYE: CPT

## 2025-04-03 PROCEDURE — 70543 MRI ORBT/FAC/NCK W/O &W/DYE: CPT

## 2025-04-03 PROCEDURE — A9575 INJ GADOTERATE MEGLUMI 0.1ML: HCPCS | Performed by: EMERGENCY MEDICINE

## 2025-04-03 PROCEDURE — 72156 MRI NECK SPINE W/O & W/DYE: CPT | Performed by: RADIOLOGY

## 2025-04-03 PROCEDURE — 2550000001 HC RX 255 CONTRASTS: Performed by: EMERGENCY MEDICINE

## 2025-04-03 PROCEDURE — 2500000001 HC RX 250 WO HCPCS SELF ADMINISTERED DRUGS (ALT 637 FOR MEDICARE OP)

## 2025-04-03 PROCEDURE — 70543 MRI ORBT/FAC/NCK W/O &W/DYE: CPT | Performed by: RADIOLOGY

## 2025-04-03 PROCEDURE — 99239 HOSP IP/OBS DSCHRG MGMT >30: CPT

## 2025-04-03 RX ORDER — ACETAZOLAMIDE 250 MG/1
250 TABLET ORAL 2 TIMES DAILY
Qty: 60 TABLET | Refills: 11 | Status: SHIPPED | OUTPATIENT
Start: 2025-04-03 | End: 2026-04-03

## 2025-04-03 RX ORDER — ACETAZOLAMIDE 250 MG/1
250 TABLET ORAL 2 TIMES DAILY
Qty: 60 TABLET | Refills: 1 | Status: SHIPPED | OUTPATIENT
Start: 2025-04-03 | End: 2025-04-03

## 2025-04-03 RX ORDER — DOCUSATE SODIUM 100 MG/1
100 CAPSULE, LIQUID FILLED ORAL DAILY PRN
Status: DISCONTINUED | OUTPATIENT
Start: 2025-04-03 | End: 2025-04-03 | Stop reason: HOSPADM

## 2025-04-03 RX ORDER — GADOTERATE MEGLUMINE 376.9 MG/ML
13 INJECTION INTRAVENOUS
Status: COMPLETED | OUTPATIENT
Start: 2025-04-03 | End: 2025-04-03

## 2025-04-03 RX ADMIN — ACETAMINOPHEN 650 MG: 325 TABLET, FILM COATED ORAL at 04:48

## 2025-04-03 RX ADMIN — GADOTERATE MEGLUMINE 13 ML: 376.9 INJECTION INTRAVENOUS at 15:07

## 2025-04-03 RX ADMIN — METOPROLOL SUCCINATE 25 MG: 25 TABLET, EXTENDED RELEASE ORAL at 08:27

## 2025-04-03 RX ADMIN — ACETAZOLAMIDE 250 MG: 250 TABLET ORAL at 20:00

## 2025-04-03 RX ADMIN — ACETAMINOPHEN 650 MG: 325 TABLET, FILM COATED ORAL at 17:14

## 2025-04-03 RX ADMIN — ACETAZOLAMIDE 250 MG: 250 TABLET ORAL at 08:27

## 2025-04-03 ASSESSMENT — COGNITIVE AND FUNCTIONAL STATUS - GENERAL
DAILY ACTIVITIY SCORE: 24
MOBILITY SCORE: 24

## 2025-04-03 ASSESSMENT — PAIN SCALES - GENERAL
PAINLEVEL_OUTOF10: 0 - NO PAIN
PAINLEVEL_OUTOF10: 6
PAINLEVEL_OUTOF10: 5 - MODERATE PAIN

## 2025-04-03 ASSESSMENT — PAIN DESCRIPTION - LOCATION: LOCATION: HEAD

## 2025-04-03 ASSESSMENT — PAIN - FUNCTIONAL ASSESSMENT
PAIN_FUNCTIONAL_ASSESSMENT: 0-10

## 2025-04-03 ASSESSMENT — PAIN DESCRIPTION - DESCRIPTORS: DESCRIPTORS: ACHING

## 2025-04-03 NOTE — HOSPITAL COURSE
Ayesha Mata is a 34 y.o. 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   Ophthalmology was consulted, exam was notable for bilateral papilledema and diplopia, no RAPD or red-desatruation, EOM grossly intact. CTV was negative for venous sinus thrombosis. MRI and LP were recommended. LP performed with opening pressure of 46cm H2O and closing pressure of 14cm H2O after removal of 14cc CSF. Normal CSF glucose (48) and protein (39), lymphocytic pleocytosis (13), RBC (0). NMO/MOG, meningitis panel, and OCBs were also ordered and pending. MRI of orbit, brain and cervical spine were performed and unremarkable. Given these findings, diagnosis consistent with IIH. Pending Flow cytology and cytometry CSF studies. Will arrange follow up in Neurology resident clinic following discharge.

## 2025-04-03 NOTE — CARE PLAN
Problem: Fall/Injury  Goal: Not fall by end of shift  Outcome: Progressing  Goal: Be free from injury by end of the shift  Outcome: Progressing  Goal: Verbalize understanding of personal risk factors for fall in the hospital  Outcome: Progressing  Goal: Verbalize understanding of risk factor reduction measures to prevent injury from fall in the home  Outcome: Progressing  Goal: Use assistive devices by end of the shift  Outcome: Progressing  Goal: Pace activities to prevent fatigue by end of the shift  Outcome: Progressing   The patient's goals for the shift include  : rest and symptom management.     The clinical goals for the shift include Pt will remain safe in hospital.

## 2025-04-03 NOTE — CARE PLAN
Transitional Care Coordinator Note: Patient discussed with medical team, per medical team patient is not medically ready. Discharge dispo: HNN.  ADOD 1-2 Days.  Keara Shipley RN  Transitional Care Coordinator

## 2025-04-03 NOTE — DISCHARGE SUMMARY
Discharge Diagnosis  IIH (idiopathic intracranial hypertension)    Issues Requiring Follow-Up  -Neuro-ophtho  -Neurology Resident Clinic appointment  -f/up with MOG, NMO, OCBs (CSF and serum) flow cytometry and cytology    Test Results Pending At Discharge  Pending Labs       Order Current Status    MOG Assay, Serum In process    NMO/AQP4-IgG, Serum In process    Oligoclonal Banding, CSF Collection In process    Oligoclonal banding In process            Hospital Course  Ayesha Mata is a 34 y.o. 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   Ophthalmology was consulted, exam was notable for bilateral papilledema and diplopia, no RAPD or red-desatruation, EOM grossly intact. CTV was negative for venous sinus thrombosis. MRI and LP were recommended. LP performed with opening pressure of 46cm H2O and closing pressure of 14cm H2O after removal of 14cc CSF. Normal CSF glucose (48) and protein (39), lymphocytic pleocytosis (13), RBC (0). NMO/MOG, meningitis panel, and OCBs were also ordered and pending. MRI of orbit, brain and cervical spine were performed.     Pertinent Physical Exam At Time of Discharge  Physical Exam    General Appearance:  No distress, alert, interactive and cooperative.      Mental State: Orientation was normal to time, place and person.      Cranial Nerves:   CN 2: Visual fields full to confrontation. No RAPD. No red-desaturation.   CN 3, 4, 6: Pupils round, 4 mm in diameter, equally reactive to light. Lids symmetric; no ptosis. EOMs mostly normal except mild abduction restriction of L gaze OS, full range with normal saccades, pursuit and convergence. No nystagmus. Diplopia worse when looking straight and with left gaze.   CN 5: Facial sensation intact bilaterally. Symmetric masseter and temporal muscles tone bilaterally.   CN 7: Normal and symmetric facial strength. Nasolabial folds  symmetric.   CN 8: Hearing intact to voice  CN 9: Palate elevates symmetrically.   CN 11: Normal strength of shoulder shrug and neck turning.   CN 12: Tongue midline, with normal bulk and strength; no fasciculations.      Motor: Muscle bulk and tone were normal in both upper and lower extremities. No fasciculations, tremor or other abnormal movements were present. Muscle strength was 5/5 in distal and proximal muscles in both upper and lower extremities.      Reflexes: Right/ Left:  Biceps 2/2, brachioradialis 2/2, triceps 2/2, patellar 2/2, ankle 2/2  Babinski: toes downgoing to plantar stimulation. No clonus, frontal release signs or other pathologic reflexes present.      Sensory: In both upper and lower extremities, sensation was intact to light touch     Coordination: In both upper extremities, finger-nose-finger was intact without dysmetria or overshoot.      Gait: not tested     Home Medications     Medication List      START taking these medications     acetaZOLAMIDE 250 mg tablet; Commonly known as: Diamox; Take 1 tablet   (250 mg) by mouth 2 times a day.     CONTINUE taking these medications     metoprolol succinate XL 25 mg 24 hr tablet; Commonly known as: Toprol-XL     STOP taking these medications     albuterol 90 mcg/actuation inhaler   ondansetron ODT 4 mg disintegrating tablet; Commonly known as:   Zofran-ODT   Trintellix 10 mg tablet tablet; Generic drug: vortioxetine       Outpatient Follow-Up  Future Appointments   Date Time Provider Department Center   5/12/2025 11:30 AM BARRERA Morales-CNP [id number] [place]       June Dozier MD  Department of Neurology, PGY-3  [facility] [id number]

## 2025-04-03 NOTE — PROGRESS NOTES
"Ayesha aMta is a 34 y.o. female on day 2 of admission presenting with Diplopia.      Subjective   She felt last night her diplopia was improving, but then developed return of her headache later in the evening. The headache improved after her second dose of diamox, which she is tolerating well so far. Diplopia she thinks is slightly improved.        Objective     Last Recorded Vitals  Blood pressure 103/73, pulse 93, temperature 36 °C (96.8 °F), resp. rate 20, height 1.727 m (5' 8\"), weight 65.8 kg (145 lb), last menstrual period 03/25/2025, SpO2 97%.    Physical Exam  Base Eye Exam       Visual Acuity (Snellen - Linear)         Right Left    Near cc 20/20 20/20              Tonometry (Tonopen, 11:22 AM)         Right Left    Pressure 15 14              Pupils         Dark Light Shape React APD    Right 5 3 Round Brisk None    Left 5 3 Round Brisk None              Visual Fields (Counting fingers)         Left Right     Full Full              Extraocular Movement         Right Left     0 0 0   0  0   0 0 0    0 0 -1   0  -1   0 0 -1                 Neuro/Psych       Oriented x3: Yes                  Additional Tests       Color         Right Left    Ishihara 11/11 11/11                        Assessment/Plan   Assessment & Plan  Diplopia    34 y.o. 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      Her initial exam is notable for excellent visual acuity (VA), no rAPD, but with left eye abduction deficit and esotropia, worse in left gaze and improved in right gaze. Her dilated fundus exam is notable for grade 2 bilateral optic disc edema. CT venogram did not show CVST. So far, we have been unable to obtain MRI due to her ICD. Lumbar puncture was notable for elevated opening pressure of 46 cm H2O and WBC 13, with lymphocytic " predominance. Given the lymphocytosis in the CSF, there is likely some underlying infectious or inflammatory etiology.     Update 4/03:  - Subjective improvement in diplopia today. Tolerating diamox well  - Meningitis panel negative, Oligoclonal bands negative  - Agree with neurology recommendation to pursue MRI Brain with contrast to evaluate for infectious or inflammatory etiologies. Will continue to follow up cerebrospinal fluid (CSF) studies as well. Recommend sending cerebrospinal fluid (CSF) for flow cytometry and cytology.      #Bilateral optic disc edema  #Left cranial nerve (CN) VI palsy  - CT Head 3/12/25 without lesion  - CT Venogram 4/01 without evidence of CVST  - Lumbar puncture with elevated OP of 46 cm H2O. Cerebrospinal fluid (CSF) studies notable for elevated WBC 13 with lymphocytic predominance  - Meningitis panel negative, Oligoclonal bands negative.  - Pending: NMO, MOG, flow cytometry, Cytology  - Follow up cerebrospinal fluid (CSF) studies including cytology and flow cytometry. Appreciate neurology recommendations           Discussed with attending physician Dr. Alejandro     Thank you for the consult. Note not final until attending signature. Please contact the Ophthalmology service with further questions or concerns.     Zak Bashir MD  Department of Ophthalmology, PGY-3     Ophthalmology Adult Pager - [phone]  Ophthalmology Pediatrics Pager - [phone]     For adult follow-up appointments, call: [phone]  For pediatric follow-up appointments, call: [phone]

## 2025-04-04 ENCOUNTER — PATIENT OUTREACH (OUTPATIENT)
Dept: CARE COORDINATION | Facility: CLINIC | Age: 35
End: 2025-04-04
Payer: COMMERCIAL

## 2025-04-04 LAB
ALB CSF/SERPL: 4.7 RATIO (ref 0–9)
ALBUMIN CSF-MCNC: 22 MG/DL (ref 0–35)
ALBUMIN SERPL-MCNC: 4726 MG/DL (ref 3500–5200)
IGG CSF-MCNC: 2 MG/DL (ref 0–6)
IGG SERPL-MCNC: 677 MG/DL (ref 768–1632)
IGG SYNTH RATE SER+CSF CALC-MRATE: 0.4 MG/D
IGG/ALB CLEAR SER+CSF-RTO: 0.63 RATIO (ref 0.28–0.66)
IGG/ALB CSF: 0.09 RATIO (ref 0.09–0.25)
OLIGOCLONAL BANDS CSF ELPH-IMP: ABNORMAL
OLIGOCLONAL BANDS CSF ELPH-IMP: NEGATIVE
OLIGOCLONAL BANDS CSF IEF: 0 BANDS (ref 0–1)

## 2025-04-04 NOTE — CARE PLAN
Problem: Fall/Injury  Goal: Not fall by end of shift  Outcome: Met  Goal: Be free from injury by end of the shift  Outcome: Met  Goal: Verbalize understanding of personal risk factors for fall in the hospital  Outcome: Met  Goal: Verbalize understanding of risk factor reduction measures to prevent injury from fall in the home  Outcome: Met  Goal: Use assistive devices by end of the shift  Outcome: Met  Goal: Pace activities to prevent fatigue by end of the shift  Outcome: Met   The patient's goals for the shift include      The clinical goals for the shift include pt will discharge home this shift

## 2025-04-04 NOTE — PROGRESS NOTES
Outreach call to patient to support a smooth transition of care from recent admission.  Left voicemail message for patient with my contact information.    Thais Mosley RN [facility]  [phone]

## 2025-04-07 ENCOUNTER — PATIENT MESSAGE (OUTPATIENT)
Dept: NEUROLOGY | Facility: CLINIC | Age: 35
End: 2025-04-07
Payer: COMMERCIAL

## 2025-04-07 LAB — MOG IGG1 SERPL QL FC: NEGATIVE

## 2025-04-09 ENCOUNTER — OFFICE VISIT (OUTPATIENT)
Dept: NEUROLOGY | Facility: HOSPITAL | Age: 35
End: 2025-04-09
Payer: COMMERCIAL

## 2025-04-09 VITALS — DIASTOLIC BLOOD PRESSURE: 80 MMHG | HEART RATE: 81 BPM | SYSTOLIC BLOOD PRESSURE: 121 MMHG

## 2025-04-09 DIAGNOSIS — G93.2 IIH (IDIOPATHIC INTRACRANIAL HYPERTENSION): Primary | ICD-10-CM

## 2025-04-09 PROCEDURE — 99205 OFFICE O/P NEW HI 60 MIN: CPT

## 2025-04-09 PROCEDURE — 99215 OFFICE O/P EST HI 40 MIN: CPT | Mod: GC

## 2025-04-09 RX ORDER — ACETAZOLAMIDE 500 MG/1
500 CAPSULE, EXTENDED RELEASE ORAL 2 TIMES DAILY
Qty: 180 CAPSULE | Refills: 1 | Status: SHIPPED | OUTPATIENT
Start: 2025-04-09 | End: 2025-10-06

## 2025-04-09 NOTE — PROGRESS NOTES
Subjective   Post hospital follow up:    Ayesha Mata is a 34 y.o. 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 Ophthalmology was consulted, exam was notable for bilateral papilledema and diplopia, no RAPD or red-desatruation, EOM grossly intact. CTV was negative for venous sinus thrombosis. MRI and LP were recommended. LP performed with opening pressure of 46cm H2O and headache improved with tap, further with OP and headache improvement further supporting IIH. However, she was found to have a mild lymphocytic pleocytosis which warranted further investigation for other causes such as inflammatory (NMO/MOGA/MS) vs neoplastic vs less likely, infectious. (Normal CSF glucose (48) and protein (39), lymphocytic pleocytosis (13), RBC (0). )    MRI Brain and Orbits and cervical spine personally reviewed with Dr. Bernstein and without evidence of inflammation or abnormal lesions. MRI of orbit, brain and cervical spine were performed and negative for abnormalities. Though rare, it is likely that the mild pleocytosis is secondary to IIH itself. Case discussed with ophthalmology.  NMO/MOG, meningitis panel, and OCBs, flow cytology and cytology were ordered in setting of lymphocytic pleocytosis. So far serum MOG negative but rest are in process as of 4/9/25.   She was discharged home on 4/3/2025, started on acetazolamide 250 BID with plans for neurology follow up and ophthalmology follow up.     She presents today for follow up, reporting slight improvement with headaches but still experiencing headache pain and pressure like sensation. Still needing to take acetaminophen daily in addition to her diamox which she reports taking morning and night. Has noticed paresthesias in fingertips and altered taste with carbonated beverages, feels this is tolerable. Denies brain fog  or word finding difficulty. Is still experiencing diplopia.    Denies hx of fevers, chills, night sweats, recent illness, reports weight loss over the past 11 months after giving birth but doesn't feel it is an inappropriate amount of weight loss. Has been exclusively breastfeeding her 11 month old son, denies menses or taking hormonal medications/supplements/OCP's.     MOG Assay, Serum  Negative   NMO/AQP4-IgG, Serum In process   Oligoclonal Banding, CSF Collection In process   Oligoclonal banding In process   CSF Flow Cytology In process   CSF Cytology  In process       Patient Active Problem List   Diagnosis    Depression    Generalized anxiety disorder    Other ventricular tachycardia    Healthcare maintenance    Cardiac defibrillator in place    CPVT (catecholaminergic polymorphic ventricular tachycardia)    Iron deficiency anemia    Migraine    Regular astigmatism of both eyes    Diplopia    IIH (idiopathic intracranial hypertension)      Past Medical History:   Diagnosis Date    Anxiety disorder, unspecified     Anxiety and depression    Contact with and (suspected) exposure to lead 07/21/2022    History of lead exposure    Depression     Other ventricular tachycardia     CPVT (catecholaminergic polymorphic ventricular tachycardia)    Personal history of diseases of the blood and blood-forming organs and certain disorders involving the immune mechanism 07/21/2022    History of iron deficiency anemia      Past Surgical History:   Procedure Laterality Date    OTHER SURGICAL HISTORY  07/21/2022    Catheter ablation    OTHER SURGICAL HISTORY  07/21/2022    Cardioverter defibrillator insertion      Social History     Socioeconomic History    Marital status:      Spouse name: Not on file    Number of children: Not on file    Years of education: Not on file    Highest education level: Not on file   Occupational History    Not on file   Tobacco Use    Smoking status: Never     Passive exposure: Never     Smokeless tobacco: Never   Substance and Sexual Activity    Alcohol use: Yes     Comment: social    Drug use: Never    Sexual activity: Not on file   Other Topics Concern    Not on file   Social History Narrative    Not on file     Social Drivers of Health     Financial Resource Strain: Not on file   Food Insecurity: Not on file   Transportation Needs: Not on file   Physical Activity: Not on file   Stress: Not on file   Social Connections: Not on file   Intimate Partner Violence: Not on file   Housing Stability: Not on file      Family History   Problem Relation Name Age of Onset    Other (Anxiety and Depression) Mother      Hypertension Mother      Breast cancer Mother      Other (HLD) Mother      Other (HLD) Father      Other (HTN) Father      Aortic aneurysm Father          Review of Systems  All other system have been reviewed and are negative for complaint.  Objective   Neurological Exam    General Appearance:  No distress, alert, interactive and cooperative.      Mental State: Orientation was normal to time, place and person.      Cranial Nerves:   Funduscopic exam: still appears to have papilledema L>R, improved in severity (subjectively) 4/1  CN 2: Visual fields full to confrontation. No RAPD. No red-desaturation.   CN 3, 4, 6: Pupils round, 4 mm in diameter, equally reactive to light. Lids symmetric; no ptosis. EOMs mostly normal except mild abduction restriction of L gaze OS, left eye lag of abduction with horizontal saccades, and normal convergence. No nystagmus. Diplopia worse when looking straight and with left gaze.   CN 5: Facial sensation intact bilaterally. Symmetric masseter and temporal muscles tone bilaterally.   CN 7: Normal and symmetric facial strength. Nasolabial folds symmetric.   CN 8: Hearing intact to voice  CN 9: Palate elevates symmetrically.   CN 11: Normal strength of shoulder shrug and neck turning.   CN 12: Tongue midline, with normal bulk and strength; no fasciculations.       Motor: Muscle bulk and tone were normal in both upper and lower extremities. No fasciculations, tremor or other abnormal movements were present. Muscle strength was 5/5 in distal and proximal muscles in both upper and lower extremities.      Reflexes: Right/ Left:  Biceps 2/2, brachioradialis 2/2, triceps 2/2, patellar 2/2, ankle 2/2  Babinski: toes downgoing to plantar stimulation. No clonus, frontal release signs or other pathologic reflexes present.      Sensory: In both upper and lower extremities, sensation was intact to light touch     Coordination: In both upper extremities, finger-nose-finger was intact without dysmetria or overshoot.      Gait: normal              Thyroid Stimulating Hormone   Date Value Ref Range Status   04/01/2025 0.96 0.44 - 3.98 mIU/L Final     CT venogram head w and wo iv contrast     Result Date: 4/2/2025  Interpreted By:  Abby Emmanuel  and Joshua Marsh STUDY: CT VENOGRAM HEAD W AND WO IV CONTRAST AND POST PROCEDURE;  4/2/2025 2:03 am   INDICATION: Signs/Symptoms:c/f IIH, rule out CVST.   COMPARISON: CT head 03/12/2025.   ACCESSION NUMBER(S): [id number]   ORDERING CLINICIAN: SUKH SALCIDO   TECHNIQUE: Noncontrast axial CT imaging was performed through the brain. CT venogram imaging was performed through the brain following the administration of 90 mL Omnipaque 350 intravenous contrast. 3D reconstructions were rendered using separate 3D workstation.   FINDINGS: CT HEAD:   Parenchyma: There is no intracranial hemorrhage. The grey-white differentiation is intact. There is no mass effect or midline shift. No pathologic enhancing lesion seen on postcontrast imaging. CSF Spaces: The ventricles, sulci and basal cisterns are within normal limits for age. Extra-Axial Fluid: There is no extraaxial fluid collection. Calvarium: The calvarium is unremarkable. Paranasal sinuses: Visualized paranasal sinuses are clear. Mastoids: Clear. Orbits: Normal. Soft tissues: Unremarkable.   VENOUS  STRUCTURES:   Superior sagittal sinus: Normal. Torcula: Normal. Straight sinus:Normal. Internal cerebral veins/vein of Pete: Normal. Transverse sinuses: Normal. Sigmoid sinuses: Normal. Proximal jugular veins: Normal.        1. No acute intracranial abnormality. 2. No evidence of venous sinus thrombosis.   I personally reviewed the images/study and resident's interpretation and I agree with the findings as stated by Salma Lewis MD    MRI Brain w/wo contrast and MRI Orbit 4/3/2025:  IMPRESSION:  1. Optic nerve head cupping along with a optic nerve/sheath complex  measurement of 6 mm can be associated with increased intracranial  pressure.  2. There is a partially empty sella. This is a nonspecific finding  but can be seen with increased intracranial pressure.      MR cervical spine w and wo IV contrast 04/03/2025    Narrative  Interpreted By:  Toribio Avila,  STUDY:  MR CERVICAL SPINE W AND WO IV CONTRAST;  4/3/2025 3:42 pm    INDICATION:  Signs/Symptoms:need to rule out NMO/MOG.  Increased intracranial  pressure      COMPARISON:  None.    ACCESSION NUMBER(S):  [id number]    ORDERING CLINICIAN:  ALEJANDRINA MEHTA    TECHNIQUE:  Multiplanar, multisequence MR imaging was performed through the  cervical spine prior to and following the administration of  intravenous contrast. Postcontrast sagittal and axial imaging  obtained. A total of 13 ML Dotarem intravenous contrast was  administered.    FINDINGS:  Alignment: Anatomic relationship between the dens, the anterior arch  of C1, the foramen magnum is unremarkable. The atlantooccipital and  atlantoaxial joint spaces measure within normal limits.    Vertebrae: T1 marrow signal is normal. Vertebral body height is  maintained.    Disc: Disc height is maintained.    Spinal cord:Signal within the spinal cord is normal.    Spinal canal: No significant spinal canal narrowing.    C2-C3: There is no significant lateral recess, neural foraminal, or  spinal canal  narrowing.    C3-C4: There is no significant lateral recess, neural foraminal, or  spinal canal narrowing.    C4-C5: There is no significant lateral recess, neural foraminal, or  spinal canal narrowing.    C5-C6: There is a very mild broad-based disc protrusion. There is no  significant lateral recess, neural foraminal, or spinal canal  narrowing.    C6-C7:  There is no significant lateral recess, neural foraminal, or  spinal canal narrowing.    C7-T1: There is no significant lateral recess, neural foraminal, or  spinal canal narrowing.    Soft tissues: The prevertebral and posterior paraspinous soft tissues  are within normal limits.    Impression  1. Negative MRI examination of the cervical spine.    MACRO:  None        Assessment/Plan     Ayesha Mata is a 34 y.o. 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 However, she was found to have a mild lymphocytic pleocytosis which warranted further investigation for other causes such as inflammatory (NMO/MOGA/MS) vs neoplastic vs less likely, infectious. (Normal CSF glucose (48) and protein (39), lymphocytic pleocytosis (13), RBC (0). ) Given this, obtained MRI brain, orbit and C spine w/wo contrast (delayed due to confirmation of ICD compatibility) but otherwise negative for inflammatory process or abnormal lesions. Exam consistent from prior with bilatearl papilledema, left abducens nerve palsy, diplopia and subjective constant pressure-like headache. Was discharged with 250mg BID acetazolamide and seen today for follow up. Sees ophthalmology in clinic tomorrow 4/10. So far, serum MOG is negative but still pending, OCBs, NMO, Flow cytometry and cytology.    Given she is still symptomatic, we increased  her acetazolamide to 500mg BID. However, the etiology behind this presentation of IIH has yet to be determined.  Given CSF findings, there is concern for potential autoimmune or neoplastic process, however imaging does not support this.  We will have more information once CSF studies have resulted.  This case was discussed and staffed with Dr. Ervin, who is also asked Dr. Daniella Aldridge to review her CTV Head imaging for potential stenosis that could lead to IIH.  If CSF results are inconclusive, may consider obtaining MRV brain with and without contrast for better visualization of venous system.  Will plan to see patient in 3 months for follow-up.    Plan:  -Increase Diamox (Acetazolamide) from 250mg BID to 500mg BID  -Follow up NMO, OCBs, CSF flow cytometry and cytology  -Consider obtaining MRV Brain w/wo contrast in future  -Return to clinic in 3 months    Pt seen and discussed with the attending physician, Dr. Arcadio Dozier MD  Department of Neurology, PGY-3  [facility] [phone]

## 2025-04-09 NOTE — PATIENT INSTRUCTIONS
We increased your Diamox medication to 500mg twice daily.    We will let you know of CSF testing results.    We will see you back in clinic in 3 months.

## 2025-04-10 ENCOUNTER — FOLLOW-UP (OUTPATIENT)
Dept: OPHTHALMOLOGY | Facility: CLINIC | Age: 35
End: 2025-04-10
Payer: COMMERCIAL

## 2025-04-10 DIAGNOSIS — Z01.00 EXAMINATION OF EYES AND VISION: Primary | ICD-10-CM

## 2025-04-10 DIAGNOSIS — R83.8: ICD-10-CM

## 2025-04-10 DIAGNOSIS — H47.11 PAPILLEDEMA ASSOCIATED WITH INCREASED INTRACRANIAL PRESSURE: ICD-10-CM

## 2025-04-10 PROCEDURE — 99070 SPECIAL SUPPLIES PHYS/QHP: CPT | Performed by: PSYCHIATRY & NEUROLOGY

## 2025-04-10 PROCEDURE — 92083 EXTENDED VISUAL FIELD XM: CPT | Performed by: PSYCHIATRY & NEUROLOGY

## 2025-04-10 PROCEDURE — 92133 CPTRZD OPH DX IMG PST SGM ON: CPT | Performed by: PSYCHIATRY & NEUROLOGY

## 2025-04-10 PROCEDURE — 92060 SENSORIMOTOR EXAMINATION: CPT | Performed by: PSYCHIATRY & NEUROLOGY

## 2025-04-10 PROCEDURE — 99205 OFFICE O/P NEW HI 60 MIN: CPT | Performed by: PSYCHIATRY & NEUROLOGY

## 2025-04-10 ASSESSMENT — ENCOUNTER SYMPTOMS
ENDOCRINE NEGATIVE: 0
NEUROLOGICAL NEGATIVE: 0
ALLERGIC/IMMUNOLOGIC NEGATIVE: 0
EYES NEGATIVE: 1
RESPIRATORY NEGATIVE: 0
HEMATOLOGIC/LYMPHATIC NEGATIVE: 0
CARDIOVASCULAR NEGATIVE: 0
GASTROINTESTINAL NEGATIVE: 0
CONSTITUTIONAL NEGATIVE: 0
PSYCHIATRIC NEGATIVE: 0
MUSCULOSKELETAL NEGATIVE: 0

## 2025-04-10 ASSESSMENT — CONF VISUAL FIELD
OD_SUPERIOR_NASAL_RESTRICTION: 0
OD_NORMAL: 1
OS_SUPERIOR_TEMPORAL_RESTRICTION: 0
OD_INFERIOR_NASAL_RESTRICTION: 0
OD_SUPERIOR_TEMPORAL_RESTRICTION: 0
OS_NORMAL: 1
OD_INFERIOR_TEMPORAL_RESTRICTION: 0
OS_INFERIOR_TEMPORAL_RESTRICTION: 0
OS_INFERIOR_NASAL_RESTRICTION: 0
OS_SUPERIOR_NASAL_RESTRICTION: 0

## 2025-04-10 ASSESSMENT — TONOMETRY
OS_IOP_MMHG: 14
OD_IOP_MMHG: 13-14
IOP_METHOD: GOLDMANN APPLANATION

## 2025-04-10 ASSESSMENT — REFRACTION_WEARINGRX
OD_SPHERE: -1.75
OS_SPHERE: -3.25
OD_CYLINDER: -1.50
OS_CYLINDER: -1.50
OS_AXIS: 110
OD_AXIS: 085

## 2025-04-10 ASSESSMENT — VISUAL ACUITY
METHOD: SNELLEN - LINEAR
CORRECTION_TYPE: GLASSES
OS_CC: 20/20
OD_CC: 20/20
OS_CC+: -1

## 2025-04-10 ASSESSMENT — CUP TO DISC RATIO
OD_RATIO: 0.2
OS_RATIO: 0.2

## 2025-04-10 ASSESSMENT — SLIT LAMP EXAM - LIDS
COMMENTS: NORMAL
COMMENTS: NORMAL

## 2025-04-10 ASSESSMENT — EXTERNAL EXAM - LEFT EYE: OS_EXAM: NORMAL

## 2025-04-10 ASSESSMENT — EXTERNAL EXAM - RIGHT EYE: OD_EXAM: NORMAL

## 2025-04-10 NOTE — PROGRESS NOTES
"Assessment and Plan    04/03/2025 MRI cervical spine with contrast, by report, shows \"1. Negative MRI examination of the cervical spine.\"  04/03/2025 MRI brain & orbits with contrast, which I personally reviewed, shows globe flattening with optic disc elevation and excess optic nerve cerebrospinal fluid (CSF).  04/02/2025 CT venogram head, which I personally reviewed, shows a right dominant system with distal transverse sinus narrowing.  03/12/2025 CT head without contrast, which I personally reviewed, shows no lesion.    07/28/2024 CT head without contrast, by report from [facility], shows \"Unremarkable noncontrast CT scan of the head. No evidence of acute intracranial process.\"    04/02/2025 lumbar puncture opening pressure 46 cm water, tube 1: RBC 0, WBC 15 (97% L, 2% M, 1% unclassified), tube 4: RBC 0 & WBC 13 (95% L, 4% M, 1% unclassified), protein 39 & glucose 48. Meningitis panel negative. IgG studies with low IgG. Oligoclonal bands 0.     Lab Results   Component Value Date/Time    MOGFACS Negative 04/02/2025 1555     Toxin studies  Lab Results   Component Value Date/Time    LEADBLOOD <0.5 07/21/2022 0952     10/12/2023 RPR non-reactive (NR).    04/10/2025 OCT RNFL  & .    04/10/2025 HVF 24-2 OD fovea 37, wnl MD -1.13 & OS fovea 36, blind spot enlargement MD -1.00.    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 She has esotropia in right gaze that could be right cranial nerve (CN) VI palsy, or just the right cranial nerve (CN) VI palsy.     The lumbar puncture was abnormal with elevated opening pressure and leukocytosis consistent with meningitis although not classic " bacterial meningitis. Considerations include viral meningitis and malignancy. More occult chronic infections such as Lyme, syphilis and tuberculosis are not likely, but treatable and worth testing. Viral meningitis tends to improve with time although normalization of intracranial pressure (ICP) and leukocytosis can be delayed. Intracranial pressure (ICP) elevation even can be chronic. The differential diagnosis also includes spine lesions although far less common as a cause and without much suspicion raised on neurological evaluation.    She was diagnosed incorrectly with idiopathic intracranial hypertension as idiopathic intracranial hypertension requires normal cerebrospinal fluid (CSF) studies.    MRI was negative for intracranial mass or venous sinus thrombosis.    Matters include treatment of intracranial pressure (ICP) elevation medically with consideration of surgery if not sufficiently controlled, but also of identifying an underlying etiology and treating it. We discussed how much to await spontaneous improvement and results from cerebrospinal fluid (CSF) testing versus going forward with testing sooner.    Plan    Continue acetazolamide at 500 mg PO BID.  Check Lyme, tuberculosis, syphilis.  Check CT chest, abdomen & pelvis.  Check MRI thoracic and lumbar spine with contrast.  Trial of Fresnel 20 PD base-out prism (MÓNICA) OS.    Follow up in 3-4 weeks with HVF & OCT, sooner if worsening with indications for hospital return reviewed today. (Dilated 4/10/2025)

## 2025-04-10 NOTE — LETTER
April 10, 2025     Guillermo Ervin DO  [address]  Department Of Neurology  [facility] [address]    Patient: Ayesha Mata   YOB: 1990   Date of Visit: 4/10/2025     Dear Dr. Guillermo Ervin DO:    I am writing to share my findings regarding our shared patient Ayesha Mata from her visit with me on 4/10/2025.    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    She has been losing weight, but attributed to post-pregnancy weight loss after delivery 11 months ago. The weight loss was not sudden for her.    Headache started over a month ago. The pulsatile tinnitus began. She started having pain at the back of her eyes. She was at [facility] ED 3/12/2025 with treatment for headache and discharge.    She had progression of severity. She awoke when day with diplopia that persisted for about a week. She was seen at [employer] by an optometrist with examination showing optic disc edema.    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    She was discharged with a diagnosis of idiopathic intracranial hypertension although cerebrospinal fluid (CSF) was abnormal.    Symptoms have improved except for diplopia that has persisted.    She denies other problems.    She followed up with neurologists Dr. June Dozier and Dr. Guillermo Ervin 4/9/2025 with acetazolamide increased.    Last edited by Zak Alejandro MD PhD on 4/10/2025  4:24 PM.        Diagnoses    Ayesha was seen today for ed-fuv.  Diagnoses and all orders for this visit:  Examination of eyes and vision (Primary)  -     Cheema Visual Field - OU - Both Eyes  -     OCT, Optic Nerve - OU - Both Eyes  Papilledema associated with  increased intracranial pressure  -     LYME (B. BURGDORFERI) AB MODIFIED 2-TITER TESTING, WITH REFLEX TO IGM AND IGG BY LUI; Future  -     Bartonella Antibody Panel; Future  -     Rickettsia rickettsii ([place] Spotted Fever) Antibodies, IgG & IgM; Future  -     Toxoplasma IgG; Future  -     Toxoplasma Gondii Antibody, IgM; Future  -     T-Spot TB; Future  -     Syphilis Screen with Reflex; Future  -     CT chest abdomen pelvis w IV contrast; Future  -     MR lumbar spine w and wo IV contrast; Future  -     MR thoracic spine w and wo IV contrast; Future  -     LYME (B. BURGDORFERI) AB MODIFIED 2-TITER TESTING, WITH REFLEX TO IGM AND IGG BY LUI  -     Bartonella Antibody Panel  -     Rickettsia rickettsii ([place] Spotted Fever) Antibodies, IgG & IgM  -     Toxoplasma IgG  -     Toxoplasma Gondii Antibody, IgM  -     T-Spot TB  -     Syphilis Screen with Reflex  -     CBC and Auto Differential; Future  -     Coagulation Screen; Future  -     CSF Cell Count With Differential; Future  -     Cytology Consultation (Non-Gynecologic); Future  -     FL guided lumbar puncture; Future  -     Flow Cytometry Test  -     TOTAL PROTEIN AND GLUCOSE, CSF; Future  -     CBC and Auto Differential  -     Coagulation Screen  High cerebrospinal fluid white blood cell count  -     LYME (B. BURGDORFERI) AB MODIFIED 2-TITER TESTING, WITH REFLEX TO IGM AND IGG BY LUI; Future  -     Bartonella Antibody Panel; Future  -     Rickettsia rickettsii ([place] Spotted Fever) Antibodies, IgG & IgM; Future  -     Toxoplasma IgG; Future  -     Toxoplasma Gondii Antibody, IgM; Future  -     T-Spot TB; Future  -     Syphilis Screen with Reflex; Future  -     CT chest abdomen pelvis w IV contrast; Future  -     MR lumbar spine w and wo IV contrast; Future  -     MR thoracic spine w and wo IV contrast; Future  -     LYME (B. BURGDORFERI) AB MODIFIED 2-TITER TESTING, WITH REFLEX TO IGM AND IGG BY LUI  -     Bartonella Antibody  "Panel  -     Rickettsia rickettsii ([place] Spotted Fever) Antibodies, IgG & IgM  -     Toxoplasma IgG  -     Toxoplasma Gondii Antibody, IgM  -     T-Spot TB  -     Syphilis Screen with Reflex  -     CBC and Auto Differential; Future  -     Coagulation Screen; Future  -     CSF Cell Count With Differential; Future  -     Cytology Consultation (Non-Gynecologic); Future  -     FL guided lumbar puncture; Future  -     Flow Cytometry Test  -     TOTAL PROTEIN AND GLUCOSE, CSF; Future  -     CBC and Auto Differential  -     Coagulation Screen    Assessment and Plan    04/03/2025 MRI cervical spine with contrast, by report, shows \"1. Negative MRI examination of the cervical spine.\"  04/03/2025 MRI brain & orbits with contrast, which I personally reviewed, shows globe flattening with optic disc elevation and excess optic nerve cerebrospinal fluid (CSF).  04/02/2025 CT venogram head, which I personally reviewed, shows a right dominant system with distal transverse sinus narrowing.  03/12/2025 CT head without contrast, which I personally reviewed, shows no lesion.    07/28/2024 CT head without contrast, by report from [facility], shows \"Unremarkable noncontrast CT scan of the head. No evidence of acute intracranial process.\"    04/02/2025 lumbar puncture opening pressure 46 cm water, tube 1: RBC 0, WBC 15 (97% L, 2% M, 1% unclassified), tube 4: RBC 0 & WBC 13 (95% L, 4% M, 1% unclassified), protein 39 & glucose 48. Meningitis panel negative. IgG studies with low IgG. Oligoclonal bands 0.     Lab Results   Component Value Date/Time    MOGFACS Negative 04/02/2025 1555     Toxin studies  Lab Results   Component Value Date/Time    LEADBLOOD <0.5 07/21/2022 0952     10/12/2023 RPR non-reactive (NR).    04/10/2025 OCT RNFL  & .    04/10/2025 HVF 24-2 OD fovea 37, wnl MD -1.13 & OS fovea 36, blind spot enlargement MD -1.00.    This 34 year-old woman with a history of  CPVT (catechoaminergic polymorphic ventricular " tachycardia) with ICD, iron deficiency anemia, migraine, anxiety, depression, regular astigmatism presents for evaluation of papilledema and diplopia.    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 She has esotropia in right gaze that could be right cranial nerve (CN) VI palsy, or just the right cranial nerve (CN) VI palsy.     The lumbar puncture was abnormal with elevated opening pressure and leukocytosis consistent with meningitis although not classic bacterial meningitis. Considerations include viral meningitis and malignancy. More occult chronic infections such as Lyme, syphilis and tuberculosis are not likely, but treatable and worth testing. Viral meningitis tends to improve with time although normalization of intracranial pressure (ICP) and leukocytosis can be delayed. Intracranial pressure (ICP) elevation even can be chronic. The differential diagnosis also includes spine lesions although far less common as a cause and without much suspicion raised on neurological evaluation.    She was diagnosed incorrectly with idiopathic intracranial hypertension as idiopathic intracranial hypertension requires normal cerebrospinal fluid (CSF) studies.    MRI was negative for intracranial mass or venous sinus thrombosis.    Matters include treatment of intracranial pressure (ICP) elevation medically with consideration of surgery if not sufficiently controlled, but also of identifying an underlying etiology and treating it. We discussed how much to await spontaneous improvement and results from cerebrospinal fluid (CSF) testing versus going forward with testing sooner.    Plan    Continue acetazolamide at 500 mg PO BID.  Check Lyme, tuberculosis, syphilis.  Check CT chest, abdomen & pelvis.  Check MRI thoracic and lumbar spine with contrast.  Trial of Fresnel 20 PD base-out prism (MÓNICA) OS.    Follow up in 3-4 weeks with F & OCT,  sooner if worsening with indications for hospital return reviewed today. (Dilated 4/10/2025)      Below you will find my full examination. I appreciate the opportunity to see Ayesha Mata today and to share in her care with you. Please contact me if you have questions for me regarding this visit or if I can be of assistance to another of your patients with neuro-ophthalmological problems.    Sincerely,    Zak Alejandro MD PhD    CC:   MD Norman Pandey, DO      Base Eye Exam       Visual Acuity (Snellen - Linear)         Right Left    Dist cc 20/20 20/20 -1      Correction: Glasses              Tonometry (Goldmann Applanation, 3:26 PM)         Right Left    Pressure 13-14 14              Pupils         Pupils Dark Light Shape React APD    Right PERRL, No APD 6 3 Round Brisk None    Left PERRL, No APD 6 3 Round Brisk None              Visual Fields         Left Right     Full Full              Neuro/Psych       Oriented x3: Yes    Mood/Affect: Normal              Dilation       Both eyes: 1% Mydriacyl & 2.5% Keny  @ 4:19 PM                  Additional Tests       Color         Right Left    Ishihara 12/12 12/12              Juda 4 Dot       Distance: Diplopia                  Additional Notes    Pronator drift absent.  Finger to nose normal.  Gait normal.         Cranial Nerves       CN V         Right Left    Overall Normal Normal              CN IX-X         Right Left    Overall Normal Normal              CN VII         Right Left    Overall Normal Normal              CN XI         Right Left    Overall Normal Normal              CN VIII         Right Left    Overall Normal Normal              CN XII         Right Left    Overall Normal Normal                  Strabismus Exam       Method: Bolwell distance with glasses      Distance Near Near +3DS N Bifocals                      - - - - - -  E(T) 20 - - - - - -                      E(T) 15 - -  - -  E(T) 20 - -  - -  E(T) 30                      - - - - - -  E(T) 20 - - - - - -                       Slit Lamp and Fundus Exam       External Exam         Right Left    External Normal Normal              Slit Lamp Exam         Right Left    Lids/Lashes Normal Normal    Conjunctiva/Sclera White and quiet White and quiet    Cornea Clear Clear    Anterior Chamber Deep and quiet Deep and quiet    Iris Round and reactive Round and reactive    Lens Clear Clear    Anterior Vitreous Normal Normal              Fundus Exam         Right Left    Posterior Vitreous Normal Normal    Disc Grade 4 edema Grade 3 edema    C/D Ratio 0.2 0.2    Macula Normal Normal    Vessels Normal Normal    Periphery Normal Normal                  Refraction       Wearing Rx         Sphere Cylinder Axis    Right -1.75 -1.50 085    Left -3.25 -1.50 110

## 2025-04-11 LAB
CELL POPULATIONS: NORMAL
DIAGNOSIS: NORMAL
FLOW DIFFERENTIAL: NORMAL
FLOW TEST ORDERED: NORMAL
LAB TEST METHOD: NORMAL
NUMBER OF CELLS COLLECTED: 61 PER TUBE
PATH REPORT.TOTAL CANCER: NORMAL
SIGNATURE COMMENT: NORMAL
SPECIMEN VIABILITY: NORMAL

## 2025-04-12 LAB
APTT PPP: 28 SEC (ref 23–32)
AQP4 H2O CHANNEL IGG SERPL QL: NEGATIVE
BASOPHILS # BLD AUTO: 58 CELLS/UL (ref 0–200)
BASOPHILS NFR BLD AUTO: 0.8 %
EOSINOPHIL # BLD AUTO: 202 CELLS/UL (ref 15–500)
EOSINOPHIL NFR BLD AUTO: 2.8 %
ERYTHROCYTE [DISTWIDTH] IN BLOOD BY AUTOMATED COUNT: 13.1 % (ref 11–15)
HCT VFR BLD AUTO: 41.6 % (ref 35–45)
HGB BLD-MCNC: 13.5 G/DL (ref 11.7–15.5)
INR PPP: 1
LYMPHOCYTES # BLD AUTO: 1800 CELLS/UL (ref 850–3900)
LYMPHOCYTES NFR BLD AUTO: 25 %
MCH RBC QN AUTO: 27.4 PG (ref 27–33)
MCHC RBC AUTO-ENTMCNC: 32.5 G/DL (ref 32–36)
MCV RBC AUTO: 84.6 FL (ref 80–100)
MONOCYTES # BLD AUTO: 461 CELLS/UL (ref 200–950)
MONOCYTES NFR BLD AUTO: 6.4 %
NEUTROPHILS # BLD AUTO: 4680 CELLS/UL (ref 1500–7800)
NEUTROPHILS NFR BLD AUTO: 65 %
PLATELET # BLD AUTO: 360 THOUSAND/UL (ref 140–400)
PMV BLD REES-ECKER: 12.2 FL (ref 7.5–12.5)
PROTHROMBIN TIME: 11 SEC (ref 9–11.5)
RBC # BLD AUTO: 4.92 MILLION/UL (ref 3.8–5.1)
WBC # BLD AUTO: 7.2 THOUSAND/UL (ref 3.8–10.8)

## 2025-04-13 LAB
B BURGDOR IGG+IGM SER QL IA: NORMAL
B HENSELAE IGG SER QL: NORMAL
B HENSELAE IGM SER QL: NORMAL
IGNF NEG CNTRL BLD: NORMAL
M TB IFN-G BLD-IMP: NEGATIVE
MITOGEN IGNF.SPOT COUNT BLD: NORMAL
QUEST PANEL A SPOT COUNT: 3
QUEST PANEL B SPOT COUNT: 0
RICK SF IGG SER QL: NORMAL
RICK SF IGM SER QL: NORMAL
T GONDII IGG SERPL IA-ACNC: NORMAL
T GONDII IGM SERPL QL IA: NORMAL
T PALLIDUM AB SER QL IA: NORMAL

## 2025-04-16 LAB
B BURGDOR IGG+IGM SER QL IA: <=0.9 INDEX
B HENSELAE IGG SER QL: NEGATIVE
B HENSELAE IGM SER QL: NEGATIVE
IGNF NEG CNTRL BLD: NORMAL
M TB IFN-G BLD-IMP: NEGATIVE
MITOGEN IGNF.SPOT COUNT BLD: NORMAL
QUEST PANEL A SPOT COUNT: 3
QUEST PANEL B SPOT COUNT: 0
RICK SF IGG SER QL: NOT DETECTED
RICK SF IGM SER QL: NOT DETECTED
T GONDII IGG SERPL IA-ACNC: <7.2 IU/ML
T GONDII IGM SERPL QL IA: <8 AU/ML
T PALLIDUM AB SER QL IA: NEGATIVE

## 2025-04-18 ENCOUNTER — PATIENT OUTREACH (OUTPATIENT)
Dept: CARE COORDINATION | Facility: CLINIC | Age: 35
End: 2025-04-18
Payer: COMMERCIAL

## 2025-04-18 NOTE — PROGRESS NOTES
[facility] follow up:  Cahrt reviewed, call placed and VMM left    Thais Mosley, RN [facility]  [phone]

## 2025-04-23 ENCOUNTER — HOSPITAL ENCOUNTER (OUTPATIENT)
Dept: RADIOLOGY | Facility: HOSPITAL | Age: 35
Discharge: HOME | End: 2025-04-23
Payer: COMMERCIAL

## 2025-04-23 DIAGNOSIS — R83.8: ICD-10-CM

## 2025-04-23 DIAGNOSIS — H47.11 PAPILLEDEMA ASSOCIATED WITH INCREASED INTRACRANIAL PRESSURE: ICD-10-CM

## 2025-04-23 PROCEDURE — 74177 CT ABD & PELVIS W/CONTRAST: CPT

## 2025-04-23 PROCEDURE — 2550000001 HC RX 255 CONTRASTS: Mod: JZ | Performed by: PSYCHIATRY & NEUROLOGY

## 2025-04-23 RX ADMIN — IOHEXOL 75 ML: 350 INJECTION, SOLUTION INTRAVENOUS at 10:53

## 2025-05-05 ENCOUNTER — PATIENT OUTREACH (OUTPATIENT)
Dept: CARE COORDINATION | Facility: CLINIC | Age: 35
End: 2025-05-05
Payer: COMMERCIAL

## 2025-05-05 NOTE — PROGRESS NOTES
[facility] follow up:  Call placed and VMM left.  Will close the DELVIN program    Thais Mosley RN [facility]  [phone]

## 2025-05-08 ENCOUNTER — APPOINTMENT (OUTPATIENT)
Dept: OPHTHALMOLOGY | Facility: CLINIC | Age: 35
End: 2025-05-08
Payer: COMMERCIAL

## 2025-05-08 DIAGNOSIS — G93.2 IIH (IDIOPATHIC INTRACRANIAL HYPERTENSION): ICD-10-CM

## 2025-05-08 DIAGNOSIS — R83.8: ICD-10-CM

## 2025-05-08 DIAGNOSIS — H47.11 PAPILLEDEMA ASSOCIATED WITH INCREASED INTRACRANIAL PRESSURE: Primary | ICD-10-CM

## 2025-05-08 PROCEDURE — 92083 EXTENDED VISUAL FIELD XM: CPT | Performed by: PSYCHIATRY & NEUROLOGY

## 2025-05-08 PROCEDURE — 99214 OFFICE O/P EST MOD 30 MIN: CPT | Performed by: PSYCHIATRY & NEUROLOGY

## 2025-05-08 PROCEDURE — 92133 CPTRZD OPH DX IMG PST SGM ON: CPT | Performed by: PSYCHIATRY & NEUROLOGY

## 2025-05-08 RX ORDER — ACETAZOLAMIDE 500 MG/1
500 CAPSULE, EXTENDED RELEASE ORAL 2 TIMES DAILY
Qty: 180 CAPSULE | Refills: 1 | Status: SHIPPED | OUTPATIENT
Start: 2025-05-08 | End: 2025-11-04

## 2025-05-08 ASSESSMENT — CUP TO DISC RATIO
OS_RATIO: 0.2
OD_RATIO: 0.2

## 2025-05-08 ASSESSMENT — REFRACTION_WEARINGRX
OD_CYLINDER: -1.50
OD_AXIS: 085
OS_CYLINDER: -1.50
OS_SPHERE: -3.25
SPECS_TYPE: SVL
OD_SPHERE: -1.75
OS_AXIS: 110

## 2025-05-08 ASSESSMENT — ENCOUNTER SYMPTOMS
CONSTITUTIONAL NEGATIVE: 0
PSYCHIATRIC NEGATIVE: 0
MUSCULOSKELETAL NEGATIVE: 0
ALLERGIC/IMMUNOLOGIC NEGATIVE: 0
CARDIOVASCULAR NEGATIVE: 0
HEMATOLOGIC/LYMPHATIC NEGATIVE: 0
GASTROINTESTINAL NEGATIVE: 0
RESPIRATORY NEGATIVE: 0
EYES NEGATIVE: 1
ENDOCRINE NEGATIVE: 0
NEUROLOGICAL NEGATIVE: 0

## 2025-05-08 ASSESSMENT — TONOMETRY
OS_IOP_MMHG: 15
OD_IOP_MMHG: 14
IOP_METHOD: TONOPEN

## 2025-05-08 ASSESSMENT — SLIT LAMP EXAM - LIDS
COMMENTS: NORMAL
COMMENTS: NORMAL

## 2025-05-08 ASSESSMENT — VISUAL ACUITY
METHOD: SNELLEN - LINEAR
CORRECTION_TYPE: GLASSES
OD_CC: 20/25+1
OD_PH_CC: 20/20
OS_CC: 20/25+2
OS_PH_CC: 20/20-2

## 2025-05-08 ASSESSMENT — EXTERNAL EXAM - LEFT EYE: OS_EXAM: NORMAL

## 2025-05-08 ASSESSMENT — EXTERNAL EXAM - RIGHT EYE: OD_EXAM: NORMAL

## 2025-05-08 NOTE — PROGRESS NOTES
"Assessment and Plan    04/23/2025 CT chest, abdomen & pelvis with contrast, by report, shows \"No evidence of inflammatory disease or malignancy. If there remains clinical concern for underlying neoplasm, further evaluation with PET-CT is recommended.\"    04/03/2025 MRI cervical spine with contrast, by report, shows \"1. Negative MRI examination of the cervical spine.\"  04/03/2025 MRI brain & orbits with contrast, which I personally reviewed previously, shows globe flattening with optic disc elevation and excess optic nerve cerebrospinal fluid (CSF).  04/02/2025 CT venogram head, which I personally reviewed previously, shows a right dominant system with distal transverse sinus narrowing.  03/12/2025 CT head without contrast, which I personally reviewed previously, shows no lesion.  07/28/2024 CT head without contrast, by report from [facility], shows \"Unremarkable noncontrast CT scan of the head. No evidence of acute intracranial process.\"    04/02/2025 lumbar puncture opening pressure 46 cm water, tube 1: RBC 0, WBC 15 (97% L, 2% M, 1% unclassified), tube 4: RBC 0 & WBC 13 (95% L, 4% M, 1% unclassified), protein 39 & glucose 48. Meningitis panel negative. IgG studies with low IgG. Oligoclonal bands 0.     Optic neuritis studies  Lab Results   Component Value Date/Time    NMOAQP4 Negative 04/02/2025 1555    MOGFACS Negative 04/02/2025 1555     Toxin studies  Lab Results   Component Value Date/Time    LEADBLOOD <0.5 07/21/2022 0952     Lyme studies  Lab Results   Component Value Date/Time    LYMEABEIA <=0.90 04/11/2025 1504     Syphilis treponemal studies  Lab Results   Component Value Date/Time    SYPHT Negative 04/11/2025 1504     Tuberculosis studies  Lab Results   Component Value Date/Time    TBSIN Negative 04/11/2025 1504     Neuro-retinitis studies  Lab Results   Component Value Date/Time    BARTHIGG Negative 04/11/2025 1504    BARTHIGM Negative 04/11/2025 1504    TOXOIGG <7.20 04/11/2025 1504    TOXOIGMAB <8.00 " 04/11/2025 1504    RMSFG Not Detected 04/11/2025 1504    RMSFM Not Detected 04/11/2025 1504     10/12/2023 RPR non-reactive (NR).    05/08/2025 OCT RNFL  & . (Lower)  04/10/2025 OCT RNFL  & .    05/08/2025 HVF 24-2 OD fovea 39, wnl MD +0.54 & OS fovea 39, wnl MD +0.91.  04/10/2025 HVF 24-2 OD fovea 37, wnl MD -1.13 & OS fovea 36, blind spot enlargement MD -1.00.    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    Vision remains good, and visual fields including Cheema visual field (HVF) are normal now. The diplopia has resolved, as has the evidence of cranial nerve (CN) VI palsy. Papilledema has resolved also.    The question is whether the underlying disease resolved versus an effect from the acetazolamide. The underlying disease could have been a transient infection. However, inflammatory and neoplastic disease still are on the differential diagnosis. We discussed whether to pursue repeat lumbar puncture or observe. We also discussed whether to have the spine imaging.    We discussed whether to tapering the acetazolamide now or wait for continued improvement before tapering.    Plan    Continue acetazolamide at 500 mg PO BID.  Repeat lumbar puncture including cytology & flow cytometry.  Check MRI thoracic and lumbar spine with contrast.    Follow up in 2 monthswith HVF & OCT, sooner if worsening with indications for hospital return reviewed today. (Dilated 4/10/2025)

## 2025-05-08 NOTE — LETTER
"May 8, 2025     Guillermo Ervin DO  [address]  Department Of Neurology  [facility] [address]    Patient: Ayesha Mata   YOB: 1990   Date of Visit: 5/8/2025     Dear Dr. Guillermo Ervin DO:    I am writing to share my findings regarding our shared patient Ayesha Mata from her visit with me on 5/8/2025.    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    Her last diplopia was about 2 weeks ago with resolution since. She stopped using the Fresnel prism. She could tell that the diplopia improved at further and further distances from her.    The patient reports the following regarding associated symptoms: headaches: a couple per week improved from before, pulsatile tinnitus: intermittent, but improved, transient visual obscurations: yes, & diplopia: improved.     She had intermittent left eye pain.    She is tolerating acetazolamide with some paresthesias.    She has light sensitivity such as outside in sunlight.  Last edited by Zak Alejandro MD PhD on 5/8/2025  4:12 PM.        Diagnoses    Diagnoses and all orders for this visit:  Papilledema associated with increased intracranial pressure (Primary)  -     OCT, Optic Nerve - OU - Both Eyes  -     Cheema Visual Field - OU - Both Eyes  High cerebrospinal fluid white blood cell count    Assessment and Plan    04/23/2025 CT chest, abdomen & pelvis with contrast, by report, shows \"No evidence of inflammatory disease or malignancy. If there remains clinical concern for underlying neoplasm, further evaluation with PET-CT is recommended.\"    04/03/2025 MRI cervical spine with contrast, by report, shows \"1. Negative MRI examination of the cervical spine.\"  04/03/2025 MRI brain & orbits with contrast, which I personally reviewed previously, shows globe " "flattening with optic disc elevation and excess optic nerve cerebrospinal fluid (CSF).  04/02/2025 CT venogram head, which I personally reviewed previously, shows a right dominant system with distal transverse sinus narrowing.  03/12/2025 CT head without contrast, which I personally reviewed previously, shows no lesion.  07/28/2024 CT head without contrast, by report from [facility], shows \"Unremarkable noncontrast CT scan of the head. No evidence of acute intracranial process.\"    04/02/2025 lumbar puncture opening pressure 46 cm water, tube 1: RBC 0, WBC 15 (97% L, 2% M, 1% unclassified), tube 4: RBC 0 & WBC 13 (95% L, 4% M, 1% unclassified), protein 39 & glucose 48. Meningitis panel negative. IgG studies with low IgG. Oligoclonal bands 0.     Optic neuritis studies  Lab Results   Component Value Date/Time    NMOAQP4 Negative 04/02/2025 1555    MOGFACS Negative 04/02/2025 1555     Toxin studies  Lab Results   Component Value Date/Time    LEADBLOOD <0.5 07/21/2022 0952     Lyme studies  Lab Results   Component Value Date/Time    LYMEABEIA <=0.90 04/11/2025 1504     Syphilis treponemal studies  Lab Results   Component Value Date/Time    SYPHT Negative 04/11/2025 1504     Tuberculosis studies  Lab Results   Component Value Date/Time    TBSIN Negative 04/11/2025 1504     Neuro-retinitis studies  Lab Results   Component Value Date/Time    BARTHIGG Negative 04/11/2025 1504    BARTHIGM Negative 04/11/2025 1504    TOXOIGG <7.20 04/11/2025 1504    TOXOIGMAB <8.00 04/11/2025 1504    RMSFG Not Detected 04/11/2025 1504    RMSFM Not Detected 04/11/2025 1504     10/12/2023 RPR non-reactive (NR).    05/08/2025 OCT RNFL  & . (Lower)  04/10/2025 OCT RNFL  & .    05/08/2025 HVF 24-2 OD fovea 39, wnl MD +0.54 & OS fovea 39, wnl MD +0.91.  04/10/2025 HVF 24-2 OD fovea 37, wnl MD -1.13 & OS fovea 36, blind spot enlargement MD -1.00.    This 34 year-old woman with a history of  CPVT (catechoaminergic polymorphic " ventricular tachycardia) with ICD, iron deficiency anemia, migraine, anxiety, depression, regular astigmatism presents for evaluation of papilledema and diplopia.    Vision remains good, and visual fields including Cheema visual field (HVF) are normal now. The diplopia has resolved, as has the evidence of cranial nerve (CN) VI palsy. Papilledema has resolved also.    The question is whether the underlying disease resolved versus an effect from the acetazolamide. The underlying disease could have been a transient infection. However, inflammatory and neoplastic disease still are on the differential diagnosis. We discussed whether to pursue repeat lumbar puncture or observe. We also discussed whether to have the spine imaging.    We discussed whether to tapering the acetazolamide now or wait for continued improvement before tapering.    Plan    Continue acetazolamide at 500 mg PO BID.  Repeat lumbar puncture including cytology & flow cytometry.  Check MRI thoracic and lumbar spine with contrast.    Follow up in 2 monthswith HVF & OCT, sooner if worsening with indications for hospital return reviewed today. (Dilated 4/10/2025)      Below you will find my full examination. I appreciate the opportunity to see Ayesha Mata today and to share in her care with you. Please contact me if you have questions for me regarding this visit or if I can be of assistance to another of your patients with neuro-ophthalmological problems.    Sincerely,    Zak Alejandro MD PhD    CC:   MD Norman Pandey, DO      Base Eye Exam       Visual Acuity (Snellen - Linear)         Right Left    Dist cc 20/25+1 20/25+2    Dist ph cc 20/20 20/20-2      Correction: Glasses              Tonometry (Tonopen, 3:36 PM)         Right Left    Pressure 14 15              Pupils         Dark Light Shape React APD    Right 4 2 Round Brisk None    Left 4 2 Round Brisk None              Extraocular Movement         Right Left     Full  Full   No internuclear ophthalmoplegia (ANTONY) with saccades             Neuro/Psych       Oriented x3: Yes    Mood/Affect: Normal                  Additional Tests       Color         Right Left    [place]ui 11/11 11/11                  Strabismus Exam       Method: Bolwell distance with correction    Correction: cc      Distance Near Near +3DS N Bifocals                      - - - - - -  Ortho  - - - - - -                      Ortho  - -  - -  Ortho  - -  - -  Ortho                      - - - - - -  Ortho  - - - - - -                       Slit Lamp and Fundus Exam       External Exam         Right Left    External Normal Normal              Slit Lamp Exam         Right Left    Lids/Lashes Normal Normal    Conjunctiva/Sclera White and quiet White and quiet    Cornea Clear Clear    Anterior Chamber Deep and quiet Deep and quiet    Iris Round and reactive Round and reactive    Lens Clear Clear    Anterior Vitreous Normal Normal              Fundus Exam         Right Left    Posterior Vitreous Normal Normal    Disc Normal Normal    C/D Ratio 0.2 0.2    Macula Normal Normal    Vessels Normal Normal    Periphery Normal Normal                  Refraction       Wearing Rx         Sphere Cylinder Axis    Right -1.75 -1.50 085    Left -3.25 -1.50 110      Type: SVL

## 2025-05-12 ENCOUNTER — APPOINTMENT (OUTPATIENT)
Dept: NEUROLOGY | Facility: CLINIC | Age: 35
End: 2025-05-12
Payer: COMMERCIAL

## 2025-05-28 ENCOUNTER — APPOINTMENT (OUTPATIENT)
Dept: RADIOLOGY | Facility: HOSPITAL | Age: 35
End: 2025-05-28
Payer: COMMERCIAL

## 2025-06-04 ENCOUNTER — PATIENT MESSAGE (OUTPATIENT)
Dept: OPHTHALMOLOGY | Facility: CLINIC | Age: 35
End: 2025-06-04
Payer: COMMERCIAL

## 2025-06-04 DIAGNOSIS — R83.8: ICD-10-CM

## 2025-06-04 DIAGNOSIS — G93.2 IIH (IDIOPATHIC INTRACRANIAL HYPERTENSION): Primary | ICD-10-CM

## 2025-06-04 DIAGNOSIS — H47.11 PAPILLEDEMA ASSOCIATED WITH INCREASED INTRACRANIAL PRESSURE: ICD-10-CM

## 2025-06-05 ENCOUNTER — HOSPITAL ENCOUNTER (OUTPATIENT)
Dept: RADIOLOGY | Facility: CLINIC | Age: 35
Discharge: HOME | End: 2025-06-05
Payer: COMMERCIAL

## 2025-06-05 ENCOUNTER — APPOINTMENT (OUTPATIENT)
Dept: RADIOLOGY | Facility: HOSPITAL | Age: 35
End: 2025-06-05
Payer: COMMERCIAL

## 2025-06-05 DIAGNOSIS — H47.11 PAPILLEDEMA ASSOCIATED WITH INCREASED INTRACRANIAL PRESSURE: ICD-10-CM

## 2025-06-05 DIAGNOSIS — G93.2 IIH (IDIOPATHIC INTRACRANIAL HYPERTENSION): ICD-10-CM

## 2025-06-05 DIAGNOSIS — R83.8: ICD-10-CM

## 2025-06-05 PROCEDURE — 70450 CT HEAD/BRAIN W/O DYE: CPT

## 2025-06-06 LAB
APTT PPP: 29 SEC (ref 23–32)
ERYTHROCYTE [DISTWIDTH] IN BLOOD BY AUTOMATED COUNT: 13 % (ref 11–15)
HCT VFR BLD AUTO: 40.6 % (ref 35–45)
HGB BLD-MCNC: 12.6 G/DL (ref 11.7–15.5)
INR PPP: 1
MCH RBC QN AUTO: 27.6 PG (ref 27–33)
MCHC RBC AUTO-ENTMCNC: 31 G/DL (ref 32–36)
MCV RBC AUTO: 89 FL (ref 80–100)
PLATELET # BLD AUTO: 352 THOUSAND/UL (ref 140–400)
PMV BLD REES-ECKER: 11.8 FL (ref 7.5–12.5)
PROTHROMBIN TIME: 11 SEC (ref 9–11.5)
RBC # BLD AUTO: 4.56 MILLION/UL (ref 3.8–5.1)
WBC # BLD AUTO: 6.6 THOUSAND/UL (ref 3.8–10.8)

## 2025-06-09 ENCOUNTER — HOSPITAL ENCOUNTER (OUTPATIENT)
Dept: RADIOLOGY | Facility: HOSPITAL | Age: 35
Discharge: HOME | End: 2025-06-09
Payer: COMMERCIAL

## 2025-06-09 VITALS
HEART RATE: 64 BPM | OXYGEN SATURATION: 97 % | SYSTOLIC BLOOD PRESSURE: 118 MMHG | RESPIRATION RATE: 16 BRPM | DIASTOLIC BLOOD PRESSURE: 68 MMHG

## 2025-06-09 DIAGNOSIS — H47.11 PAPILLEDEMA ASSOCIATED WITH INCREASED INTRACRANIAL PRESSURE: ICD-10-CM

## 2025-06-09 DIAGNOSIS — R83.8: ICD-10-CM

## 2025-06-09 LAB
APPEARANCE CSF: CLEAR
COLOR CSF: COLORLESS
COLOR SPUN CSF: COLORLESS
GLUCOSE CSF-MCNC: 59 MG/DL (ref 40–70)
LYMPHOCYTES NFR CSF MANUAL: 90 % (ref 28–96)
MONOS+MACROS NFR CSF MANUAL: 10 % (ref 16–56)
NEUTS SEG NFR CSF MANUAL: 0 % (ref 0–5)
PROT CSF-MCNC: 50 MG/DL (ref 15–45)
RBC # CSF AUTO: <2000 /UL (ref 0–5)
TOTAL CELLS COUNTED CSF: 100
TUBE # CSF: ABNORMAL
WBC # CSF AUTO: 17 /UL (ref 1–5)

## 2025-06-09 PROCEDURE — 7100000009 HC PHASE TWO TIME - INITIAL BASE CHARGE

## 2025-06-09 PROCEDURE — 62328 DX LMBR SPI PNXR W/FLUOR/CT: CPT

## 2025-06-09 PROCEDURE — 62328 DX LMBR SPI PNXR W/FLUOR/CT: CPT | Performed by: RADIOLOGY

## 2025-06-09 PROCEDURE — 7100000010 HC PHASE TWO TIME - EACH INCREMENTAL 1 MINUTE

## 2025-06-09 PROCEDURE — 2500000004 HC RX 250 GENERAL PHARMACY W/ HCPCS (ALT 636 FOR OP/ED): Performed by: PSYCHIATRY & NEUROLOGY

## 2025-06-09 PROCEDURE — 82945 GLUCOSE OTHER FLUID: CPT | Performed by: PSYCHIATRY & NEUROLOGY

## 2025-06-09 PROCEDURE — 89051 BODY FLUID CELL COUNT: CPT | Performed by: PSYCHIATRY & NEUROLOGY

## 2025-06-09 RX ORDER — LIDOCAINE HYDROCHLORIDE 10 MG/ML
3 INJECTION, SOLUTION EPIDURAL; INFILTRATION; INTRACAUDAL; PERINEURAL ONCE
Status: COMPLETED | OUTPATIENT
Start: 2025-06-09 | End: 2025-06-09

## 2025-06-09 RX ADMIN — LIDOCAINE HYDROCHLORIDE 30 MG: 10 INJECTION, SOLUTION EPIDURAL; INFILTRATION; INTRACAUDAL; PERINEURAL at 08:30

## 2025-06-09 ASSESSMENT — PAIN - FUNCTIONAL ASSESSMENT: PAIN_FUNCTIONAL_ASSESSMENT: 0-10

## 2025-06-09 ASSESSMENT — PAIN SCALES - GENERAL: PAINLEVEL_OUTOF10: 0 - NO PAIN

## 2025-06-09 NOTE — POST-PROCEDURE NOTE
Neuroradiology  Brief Postprocedure Note    Attending: Dr. Aram Young MD    Assistant: Dr. Mayi Akbar DO    Diagnosis: Diagnoses of Papilledema associated with increased intracranial pressure and High cerebrospinal fluid white blood cell count .    Description of procedure: L3-L4 vertebral space marked under fluoroscopy. Patient was prepped and draped in the usual sterile fashion. 3 ml 1% lidocaine injected for local anesthesia. Thecal sac at L3-L4 vertebral space accessed under fluoroscopic guidance using 20G spinal needle.     Opening pressure in the right lateral decubitus position was 21 cm water.    Received 14 cc clear CSF which was sent to lab. Stylet was replaced and needle was removed. Band-Aid applied. No immediate complications. See radiology report for details.     Anesthesia:  Topical    Complications: None    Estimated Blood Loss: none    Medications (Filter: Administrations occurring from 1011 to 1011 on 06/09/25) As of 06/09/25 1011      None          No specimens collected      See detailed result report with images in PACS.    The patient tolerated the procedure well without incident or complication and is in stable condition.

## 2025-06-09 NOTE — PRE-PROCEDURE NOTE
Neuroradiology Preprocedure Note    Indication for procedure: Diagnoses of Papilledema associated with increased intracranial pressure and High cerebrospinal fluid white blood cell count were pertinent to this visit.    Relevant Labs:   Lab Results   Component Value Date    CREATININE 0.66 04/02/2025    EGFR >90 04/02/2025    INR 1.0 06/05/2025    PROTIME 11.0 06/05/2025    PREGTESTUR Negative 04/01/2025       Planned Sedation/Anesthesia: Local anesthesia with Lidocaine injection    Airway assessment: normal    Directed physical examination:    General: Patient is awake, alert, oriented and in no acute distress. Seated upright in chair  Lungs: normal respiratory effort  Psych: normal affect     Mallampati: I (soft palate, uvula, fauces, and tonsillar pillars visible)    ASA Score: ASA 2 - Patient with mild systemic disease with no functional limitations    Benefits, risks and alternatives of procedure and planned sedation have been discussed with the patient and/or their representative. All questions answered and they agree to proceed.

## 2025-06-10 LAB
LABORATORY COMMENT REPORT: NORMAL
LABORATORY COMMENT REPORT: NORMAL
PATH REPORT.FINAL DX SPEC: NORMAL
PATH REPORT.GROSS SPEC: NORMAL
PATH REPORT.RELEVANT HX SPEC: NORMAL
PATH REPORT.TOTAL CANCER: NORMAL
RESIDENT REVIEW: NORMAL

## 2025-06-10 PROCEDURE — 88112 CYTOPATH CELL ENHANCE TECH: CPT | Mod: TC,MCY

## 2025-06-11 LAB
CELL POPULATIONS: NORMAL
DIAGNOSIS: NORMAL
FLOW DIFFERENTIAL: NORMAL
FLOW TEST ORDERED: NORMAL
FLUID CELL COUNT: 17 /UL
LAB TEST METHOD: NORMAL
NUMBER OF CELLS COLLECTED: NORMAL
PATH REPORT.TOTAL CANCER: NORMAL
SIGNATURE COMMENT: NORMAL
SPECIMEN VIABILITY: NORMAL

## 2025-06-11 PROCEDURE — 88185 FLOWCYTOMETRY/TC ADD-ON: CPT | Mod: TC | Performed by: PSYCHIATRY & NEUROLOGY

## 2025-06-23 ENCOUNTER — PATIENT MESSAGE (OUTPATIENT)
Dept: OPHTHALMOLOGY | Facility: CLINIC | Age: 35
End: 2025-06-23
Payer: COMMERCIAL

## 2025-06-23 DIAGNOSIS — G97.1 POST LUMBAR PUNCTURE HEADACHE: Primary | ICD-10-CM

## 2025-07-02 ENCOUNTER — APPOINTMENT (OUTPATIENT)
Dept: NEUROLOGY | Facility: HOSPITAL | Age: 35
End: 2025-07-02
Payer: COMMERCIAL

## 2025-07-02 VITALS
HEART RATE: 90 BPM | DIASTOLIC BLOOD PRESSURE: 87 MMHG | WEIGHT: 140 LBS | SYSTOLIC BLOOD PRESSURE: 131 MMHG | BODY MASS INDEX: 21.29 KG/M2

## 2025-07-02 DIAGNOSIS — G43.009 MIGRAINE WITHOUT AURA, NOT INTRACTABLE, WITHOUT STATUS MIGRAINOSUS: Primary | ICD-10-CM

## 2025-07-02 DIAGNOSIS — G93.2 IIH (IDIOPATHIC INTRACRANIAL HYPERTENSION): ICD-10-CM

## 2025-07-02 PROCEDURE — 99213 OFFICE O/P EST LOW 20 MIN: CPT

## 2025-07-02 PROCEDURE — 92250 FUNDUS PHOTOGRAPHY W/I&R: CPT | Performed by: STUDENT IN AN ORGANIZED HEALTH CARE EDUCATION/TRAINING PROGRAM

## 2025-07-02 PROCEDURE — 99213 OFFICE O/P EST LOW 20 MIN: CPT | Mod: GC

## 2025-07-02 RX ORDER — ACETAZOLAMIDE 500 MG/1
500 CAPSULE, EXTENDED RELEASE ORAL 2 TIMES DAILY
Qty: 180 CAPSULE | Refills: 2 | Status: SHIPPED | OUTPATIENT
Start: 2025-07-02 | End: 2026-03-29

## 2025-07-02 NOTE — PROGRESS NOTES
Subjective     Ayesha Mata is a 34 y.o. 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 Ophthalmology was consulted, exam was notable for bilateral papilledema and diplopia, no RAPD or red-desatruation, EOM grossly intact. CTV was negative for venous sinus thrombosis. MRI and LP were recommended. LP performed with opening pressure of 46cm H2O and headache improved with tap, further with OP and headache improvement further supporting IIH. However, she was found to have a mild lymphocytic pleocytosis which warranted further investigation for other causes such as inflammatory (NMO/MOGA/MS) vs neoplastic vs less likely, infectious. (Normal CSF glucose (48) and protein (39), lymphocytic pleocytosis (13), RBC (0). )    MRI Brain and Orbits and cervical spine personally reviewed with Dr. Bernstein and without evidence of inflammation or abnormal lesions. MRI of orbit, brain and cervical spine were performed and negative for abnormalities. Though rare, it is likely that the mild pleocytosis is secondary to IIH itself. Case discussed with ophthalmology.  NMO/MOG, meningitis panel, and OCBs, flow cytology and cytology were ordered in setting of lymphocytic pleocytosis. So far serum MOG negative but rest are in process as of 4/9/25.   She was discharged home on 4/3/2025, started on acetazolamide 250 BID which was increased to 500 BID on last visit in April.    Interval Hx:  Since being on regimen of 500 mg Diamox twice daily, patient has improved back to her baseline.  Reports no recent headaches, diplopia resolved after 1 month of the 500 mg twice daily Diamox.  Experiences occasional brain fog on this Diamox dosing but it is not significant.  Her repeat LP in June still showing pleocytosis with a white count of 17 protein slightly elevated at the, however cytology not  showing evidence of a monoclonal B-cell population.  And clinically patient is back to normal at her baseline.  Also obtained a blood patch on June 25th.       MOG Assay, Serum  Negative   NMO/AQP4-IgG, Serum In process   Oligoclonal Banding, CSF Collection In process   Oligoclonal banding In process   CSF Flow Cytology In process   CSF Cytology  In process       Patient Active Problem List   Diagnosis    Depression    Generalized anxiety disorder    Other ventricular tachycardia    Healthcare maintenance    Cardiac defibrillator in place    CPVT (catecholaminergic polymorphic ventricular tachycardia)    Iron deficiency anemia    Migraine    Regular astigmatism of both eyes    Diplopia      Past Medical History:   Diagnosis Date    Anxiety     Anxiety disorder, unspecified     Anxiety and depression    Brain concussion     Contact with and (suspected) exposure to lead 07/21/2022    History of lead exposure    Depression     Migraine     Other ventricular tachycardia     CPVT (catecholaminergic polymorphic ventricular tachycardia)    Personal history of diseases of the blood and blood-forming organs and certain disorders involving the immune mechanism 07/21/2022    History of iron deficiency anemia      Past Surgical History:   Procedure Laterality Date    OTHER SURGICAL HISTORY  07/21/2022    Catheter ablation    OTHER SURGICAL HISTORY  07/21/2022    Cardioverter defibrillator insertion      Social History     Socioeconomic History    Marital status:      Spouse name: Not on file    Number of children: Not on file    Years of education: Not on file    Highest education level: Not on file   Occupational History    Not on file   Tobacco Use    Smoking status: Never     Passive exposure: Never    Smokeless tobacco: Never   Substance and Sexual Activity    Alcohol use: Not Currently     Comment: social    Drug use: Never    Sexual activity: Yes     Partners: Male     Birth control/protection: Condom Male   Other  Topics Concern    Not on file   Social History Narrative    Not on file     Social Drivers of Health     Financial Resource Strain: Not on file   Food Insecurity: Not on file   Transportation Needs: Not on file   Physical Activity: Not on file   Stress: Not on file   Social Connections: Not on file   Intimate Partner Violence: Not on file   Housing Stability: Not on file      Family History   Problem Relation Name Age of Onset    Other (Anxiety and Depression) Mother Hannah     Hypertension Mother Hannah     Breast cancer Mother Hannah     Other (HLD) Mother Hannah     Anxiety disorder Mother Hannah     Depression Mother Hannah     Migraines Mother Hannah     Other (HLD) Father      Other (HTN) Father      Aortic aneurysm Father      Migraines Maternal Grandfather Lavell     Cataracts Maternal Grandmother Serena     Alzheimer's disease Maternal Grandmother Serena     Migraines Maternal Grandmother Serena     Alzheimer's disease Paternal Grandfather Nael         Review of Systems  All other system have been reviewed and are negative for complaint.  Objective   Neurological Exam    General Appearance:  No distress, alert, interactive and cooperative.      Mental State: Orientation was normal to time, place and person.      Cranial Nerves:   Funduscopic exam: papilledema resolved, normal funduscopic exam bilaterally  CN 2: Visual fields full to confrontation. No RAPD. No red-desaturation.   CN 3, 4, 6: Pupils round, 4 mm in diameter, equally reactive to light. Lids symmetric; no ptosis. EOMs intact. No nystagmus. No diplopia  CN 5: Facial sensation intact bilaterally. Symmetric masseter and temporal muscles tone bilaterally.   CN 7: Normal and symmetric facial strength. Nasolabial folds symmetric.   CN 8: Hearing intact to voice  CN 9: Palate elevates symmetrically.   CN 11: Normal strength of shoulder shrug and neck turning.   CN 12: Tongue midline, with normal bulk and strength; no fasciculations.      Motor: Muscle bulk and tone were  normal in both upper and lower extremities. No fasciculations, tremor or other abnormal movements were present. Muscle strength was 5/5 in distal and proximal muscles in both upper and lower extremities.      Reflexes: Right/ Left:  Biceps 2/2, brachioradialis 2/2, triceps 2/2, patellar 2/2, ankle 2/2  Babinski: toes downgoing to plantar stimulation. No clonus, frontal release signs or other pathologic reflexes present.      Sensory: In both upper and lower extremities, sensation was intact to light touch     Coordination: In both upper extremities, finger-nose-finger was intact without dysmetria or overshoot.      Gait: normal              Thyroid Stimulating Hormone   Date Value Ref Range Status   04/01/2025 0.96 0.44 - 3.98 mIU/L Final     Optical Coherence Tomography 7/2/25:        CT venogram head w and wo iv contrast     Result Date: 4/2/2025  Interpreted By:  Abby Emmanuel,  and Joshua Marsh STUDY: CT VENOGRAM HEAD W AND WO IV CONTRAST AND POST PROCEDURE;  4/2/2025 2:03 am   INDICATION: Signs/Symptoms:c/f IIH, rule out CVST.   COMPARISON: CT head 03/12/2025.   ACCESSION NUMBER(S): [id number]   ORDERING CLINICIAN: SUKH SALCIDO   TECHNIQUE: Noncontrast axial CT imaging was performed through the brain. CT venogram imaging was performed through the brain following the administration of 90 mL Omnipaque 350 intravenous contrast. 3D reconstructions were rendered using separate 3D workstation.   FINDINGS: CT HEAD:   Parenchyma: There is no intracranial hemorrhage. The grey-white differentiation is intact. There is no mass effect or midline shift. No pathologic enhancing lesion seen on postcontrast imaging. CSF Spaces: The ventricles, sulci and basal cisterns are within normal limits for age. Extra-Axial Fluid: There is no extraaxial fluid collection. Calvarium: The calvarium is unremarkable. Paranasal sinuses: Visualized paranasal sinuses are clear. Mastoids: Clear. Orbits: Normal. Soft tissues: Unremarkable.    VENOUS STRUCTURES:   Superior sagittal sinus: Normal. Torcula: Normal. Straight sinus:Normal. Internal cerebral veins/vein of Pete: Normal. Transverse sinuses: Normal. Sigmoid sinuses: Normal. Proximal jugular veins: Normal.        1. No acute intracranial abnormality. 2. No evidence of venous sinus thrombosis.   I personally reviewed the images/study and resident's interpretation and I agree with the findings as stated by Salma Lewis MD    MRI Brain w/wo contrast and MRI Orbit 4/3/2025:  IMPRESSION:  1. Optic nerve head cupping along with a optic nerve/sheath complex  measurement of 6 mm can be associated with increased intracranial  pressure.  2. There is a partially empty sella. This is a nonspecific finding  but can be seen with increased intracranial pressure.      MR cervical spine w and wo IV contrast 04/03/2025    Narrative  Interpreted By:  Toribio Avila,  STUDY:  MR CERVICAL SPINE W AND WO IV CONTRAST;  4/3/2025 3:42 pm    INDICATION:  Signs/Symptoms:need to rule out NMO/MOG.  Increased intracranial  pressure      COMPARISON:  None.    ACCESSION NUMBER(S):  [id number]    ORDERING CLINICIAN:  ALEJANDRINA MEHTA    TECHNIQUE:  Multiplanar, multisequence MR imaging was performed through the  cervical spine prior to and following the administration of  intravenous contrast. Postcontrast sagittal and axial imaging  obtained. A total of 13 ML Dotarem intravenous contrast was  administered.    FINDINGS:  Alignment: Anatomic relationship between the dens, the anterior arch  of C1, the foramen magnum is unremarkable. The atlantooccipital and  atlantoaxial joint spaces measure within normal limits.    Vertebrae: T1 marrow signal is normal. Vertebral body height is  maintained.    Disc: Disc height is maintained.    Spinal cord:Signal within the spinal cord is normal.    Spinal canal: No significant spinal canal narrowing.    C2-C3: There is no significant lateral recess, neural foraminal, or  spinal canal  narrowing.    C3-C4: There is no significant lateral recess, neural foraminal, or  spinal canal narrowing.    C4-C5: There is no significant lateral recess, neural foraminal, or  spinal canal narrowing.    C5-C6: There is a very mild broad-based disc protrusion. There is no  significant lateral recess, neural foraminal, or spinal canal  narrowing.    C6-C7:  There is no significant lateral recess, neural foraminal, or  spinal canal narrowing.    C7-T1: There is no significant lateral recess, neural foraminal, or  spinal canal narrowing.    Soft tissues: The prevertebral and posterior paraspinous soft tissues  are within normal limits.    Impression  1. Negative MRI examination of the cervical spine.    MACRO:  None        Assessment/Plan     Ayesha Mata is a 34 y.o. 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 However, she was found to have a mild lymphocytic pleocytosis which warranted further investigation for other causes such as inflammatory (NMO/MOGA/MS) vs neoplastic vs less likely, infectious. (Normal CSF glucose (48) and protein (39), lymphocytic pleocytosis (13), RBC (0). ) Given this, obtained MRI brain, orbit and C spine w/wo contrast (delayed due to confirmation of ICD compatibility) but otherwise negative for inflammatory process or abnormal lesions. Prior exam with with bilateral papilledema, left abducens nerve palsy, diplopia and subjective constant pressure-like headache have all completely resolved taking Diamox. Papilledema resolved confirmed on OCT imaging. CSF studies repeated June 9th still with pleocytosis, White count of 17 however, no monoclonal B cell population detected and clinically this patient is looking excellent back to  baseline. S/p blood patch. We will continue Diamox as is for now for IIH and plan to see her in 6 months at that time, considering initiation of tapering of Diamox.    Plan:  -Continue Diamox (Acetazolamide) 500mg BID  -Return to clinic in 6 months    Pt seen and discussed with the attending physician, Dr. Arcadio Dozier MD  Department of Neurology, PGY-3  [place] [phone]

## 2025-07-03 PROBLEM — G93.2 IIH (IDIOPATHIC INTRACRANIAL HYPERTENSION): Status: ACTIVE | Noted: 2025-07-03

## 2025-07-03 NOTE — ADDENDUM NOTE
Addended by: HARRIS SERVIN on: 7/3/2025 08:11 AM     Modules accepted: Level of Service    
OB/GYN/Digestive

## 2025-07-16 ENCOUNTER — PATIENT MESSAGE (OUTPATIENT)
Dept: NEUROLOGY | Facility: HOSPITAL | Age: 35
End: 2025-07-16

## 2025-07-17 ENCOUNTER — APPOINTMENT (OUTPATIENT)
Dept: OPHTHALMOLOGY | Facility: CLINIC | Age: 35
End: 2025-07-17
Payer: COMMERCIAL

## 2025-08-06 ENCOUNTER — APPOINTMENT (OUTPATIENT)
Dept: OBSTETRICS AND GYNECOLOGY | Facility: CLINIC | Age: 35
End: 2025-08-06
Payer: COMMERCIAL

## 2025-08-26 ENCOUNTER — TELEPHONE (OUTPATIENT)
Dept: NEUROLOGY | Facility: HOSPITAL | Age: 35
End: 2025-08-26
Payer: COMMERCIAL

## 2025-08-26 DIAGNOSIS — G93.2 IIH (IDIOPATHIC INTRACRANIAL HYPERTENSION): Primary | ICD-10-CM

## 2025-08-26 RX ORDER — ACETAZOLAMIDE 250 MG/1
500 TABLET ORAL 3 TIMES DAILY
Qty: 540 TABLET | Refills: 3 | Status: SHIPPED | OUTPATIENT
Start: 2025-08-26 | End: 2026-08-26